# Patient Record
Sex: FEMALE | Race: WHITE | NOT HISPANIC OR LATINO | URBAN - METROPOLITAN AREA
[De-identification: names, ages, dates, MRNs, and addresses within clinical notes are randomized per-mention and may not be internally consistent; named-entity substitution may affect disease eponyms.]

---

## 2017-10-26 ENCOUNTER — OUTPATIENT (OUTPATIENT)
Dept: OUTPATIENT SERVICES | Facility: HOSPITAL | Age: 82
LOS: 1 days | End: 2017-10-26
Payer: MEDICARE

## 2017-10-26 VITALS
HEART RATE: 70 BPM | DIASTOLIC BLOOD PRESSURE: 90 MMHG | TEMPERATURE: 98 F | OXYGEN SATURATION: 98 % | HEIGHT: 65 IN | SYSTOLIC BLOOD PRESSURE: 140 MMHG | RESPIRATION RATE: 16 BRPM | WEIGHT: 171.52 LBS

## 2017-10-26 DIAGNOSIS — M16.11 UNILATERAL PRIMARY OSTEOARTHRITIS, RIGHT HIP: ICD-10-CM

## 2017-10-26 DIAGNOSIS — Z01.818 ENCOUNTER FOR OTHER PREPROCEDURAL EXAMINATION: ICD-10-CM

## 2017-10-26 DIAGNOSIS — Z96.642 PRESENCE OF LEFT ARTIFICIAL HIP JOINT: Chronic | ICD-10-CM

## 2017-10-26 LAB
ALBUMIN SERPL ELPH-MCNC: 4 G/DL — SIGNIFICANT CHANGE UP (ref 3.3–5)
ALP SERPL-CCNC: 55 U/L — SIGNIFICANT CHANGE UP (ref 30–120)
ALT FLD-CCNC: 27 U/L DA — SIGNIFICANT CHANGE UP (ref 10–60)
ANION GAP SERPL CALC-SCNC: 6 MMOL/L — SIGNIFICANT CHANGE UP (ref 5–17)
APTT BLD: 33.5 SEC — SIGNIFICANT CHANGE UP (ref 27.5–37.4)
AST SERPL-CCNC: 27 U/L — SIGNIFICANT CHANGE UP (ref 10–40)
BILIRUB SERPL-MCNC: 0.7 MG/DL — SIGNIFICANT CHANGE UP (ref 0.2–1.2)
BLD GP AB SCN SERPL QL: SIGNIFICANT CHANGE UP
BUN SERPL-MCNC: 17 MG/DL — SIGNIFICANT CHANGE UP (ref 7–23)
CALCIUM SERPL-MCNC: 10 MG/DL — SIGNIFICANT CHANGE UP (ref 8.4–10.5)
CHLORIDE SERPL-SCNC: 106 MMOL/L — SIGNIFICANT CHANGE UP (ref 96–108)
CO2 SERPL-SCNC: 29 MMOL/L — SIGNIFICANT CHANGE UP (ref 22–31)
CREAT SERPL-MCNC: 0.9 MG/DL — SIGNIFICANT CHANGE UP (ref 0.5–1.3)
GLUCOSE SERPL-MCNC: 112 MG/DL — HIGH (ref 70–99)
HCT VFR BLD CALC: 42 % — SIGNIFICANT CHANGE UP (ref 34.5–45)
HGB BLD-MCNC: 14.1 G/DL — SIGNIFICANT CHANGE UP (ref 11.5–15.5)
INR BLD: 2.37 RATIO — HIGH (ref 0.88–1.16)
MCHC RBC-ENTMCNC: 33.4 GM/DL — SIGNIFICANT CHANGE UP (ref 32–36)
MCHC RBC-ENTMCNC: 33.8 PG — SIGNIFICANT CHANGE UP (ref 27–34)
MCV RBC AUTO: 101 FL — HIGH (ref 80–100)
MRSA PCR RESULT.: SIGNIFICANT CHANGE UP
PLATELET # BLD AUTO: 206 K/UL — SIGNIFICANT CHANGE UP (ref 150–400)
POTASSIUM SERPL-MCNC: 4.7 MMOL/L — SIGNIFICANT CHANGE UP (ref 3.5–5.3)
POTASSIUM SERPL-SCNC: 4.7 MMOL/L — SIGNIFICANT CHANGE UP (ref 3.5–5.3)
PROT SERPL-MCNC: 7.7 G/DL — SIGNIFICANT CHANGE UP (ref 6–8.3)
PROTHROM AB SERPL-ACNC: 26.3 SEC — HIGH (ref 9.8–12.7)
RBC # BLD: 4.16 M/UL — SIGNIFICANT CHANGE UP (ref 3.8–5.2)
RBC # FLD: 12.1 % — SIGNIFICANT CHANGE UP (ref 10.3–14.5)
S AUREUS DNA NOSE QL NAA+PROBE: SIGNIFICANT CHANGE UP
SODIUM SERPL-SCNC: 141 MMOL/L — SIGNIFICANT CHANGE UP (ref 135–145)
WBC # BLD: 6 K/UL — SIGNIFICANT CHANGE UP (ref 3.8–10.5)
WBC # FLD AUTO: 6 K/UL — SIGNIFICANT CHANGE UP (ref 3.8–10.5)

## 2017-10-26 PROCEDURE — 85730 THROMBOPLASTIN TIME PARTIAL: CPT

## 2017-10-26 PROCEDURE — 87640 STAPH A DNA AMP PROBE: CPT

## 2017-10-26 PROCEDURE — 86850 RBC ANTIBODY SCREEN: CPT

## 2017-10-26 PROCEDURE — 93010 ELECTROCARDIOGRAM REPORT: CPT | Mod: NC

## 2017-10-26 PROCEDURE — 93005 ELECTROCARDIOGRAM TRACING: CPT

## 2017-10-26 PROCEDURE — 87641 MR-STAPH DNA AMP PROBE: CPT

## 2017-10-26 PROCEDURE — G0463: CPT

## 2017-10-26 PROCEDURE — 86901 BLOOD TYPING SEROLOGIC RH(D): CPT

## 2017-10-26 PROCEDURE — 85027 COMPLETE CBC AUTOMATED: CPT

## 2017-10-26 PROCEDURE — 86900 BLOOD TYPING SEROLOGIC ABO: CPT

## 2017-10-26 PROCEDURE — 85610 PROTHROMBIN TIME: CPT

## 2017-10-26 PROCEDURE — 80053 COMPREHEN METABOLIC PANEL: CPT

## 2017-10-26 NOTE — H&P PST ADULT - ASSESSMENT
81yo female patient scheduled for surgery on 11/14/17. She will obtain Medical and Cardiac clearances. She will obtain instructions from her Cardiologist re: holding Warfarin pre-op. She will take Levothyroxine and Pepcid on AM of surgery with a sip of water.   All other pre-op instructions reviewed with patient.   She denies any metal allergies.   She has had a stress test and echo and results are pending. 81yo female patient scheduled for surgery on 11/14/17. She will obtain Medical and Cardiac clearances. She will obtain instructions from her Cardiologist re: holding Warfarin pre-op. She will take Levothyroxine and Pepcid on AM of surgery with a sip of water.   All other pre-op instructions reviewed with patient.   She denies any metal allergies.   She has had a stress test and echo and results are pending.   When reviewed instructions pt states that she was told that she would be holding Warfarin for 5 days and will be bridging with Lovenox pre-operatively.

## 2017-10-26 NOTE — H&P PST ADULT - FAMILY HISTORY
Father  Still living? No  Family history of leukemia, Age at diagnosis: Age Unknown  Family history of myocardial infarction at age less than 60, Age at diagnosis: Age Unknown     Mother  Still living? No  Family history of DVT, Age at diagnosis: Age Unknown  Family history of dementia, Age at diagnosis: Age Unknown     Sibling  Still living? Yes, Estimated age: 71-80  Family history of asthma, Age at diagnosis: Age Unknown     Child  Still living? Yes, Estimated age: 51-60  Family history of factor V deficiency, Age at diagnosis: Age Unknown

## 2017-10-26 NOTE — H&P PST ADULT - PMH
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

## 2017-10-26 NOTE — H&P PST ADULT - HISTORY OF PRESENT ILLNESS
83yo female patient with approximately one year history of progressively worsening right hip pain. She states that she was told when she had her left THR in 2014, she was told that she would need this hip replaced as well. She rates the pain at approximately 5/10. She is not taking any pain medication, keeps her leg elevated as much as possible. She has not had any prior treatment for the pain. She was told that THR is recommended and she presents today for PSTs.

## 2017-10-26 NOTE — H&P PST ADULT - EXTREMITIES COMMENTS
Pt. states that she was evaluated by vascular specialist in 2014- pre-op. She was told to keep LE elevated. No change since then.

## 2017-10-26 NOTE — H&P PST ADULT - NSANTHOSAYNRD_GEN_A_CORE
No. GUNNAR screening performed.  STOP BANG Legend: 0-2 = LOW Risk; 3-4 = INTERMEDIATE Risk; 5-8 = HIGH Risk/has had a negative sleep study

## 2017-10-26 NOTE — H&P PST ADULT - PSH
H/O superior vena cava filter placement    S/P appendectomy  66 ya  S/P  section    S/P wrist surgery  1997, left  Status post total replacement of left hip

## 2017-10-26 NOTE — H&P PST ADULT - MUSCULOSKELETAL
detailed exam no joint swelling/no joint erythema/no joint warmth/right hip/decreased ROM due to pain/no calf tenderness/decreased ROM/diminished strength details…

## 2017-11-07 RX ORDER — APREPITANT 80 MG/1
40 CAPSULE ORAL ONCE
Qty: 0 | Refills: 0 | Status: COMPLETED | OUTPATIENT
Start: 2017-11-14 | End: 2017-11-14

## 2017-11-13 RX ORDER — FAMOTIDINE 10 MG/ML
0 INJECTION INTRAVENOUS
Qty: 0 | Refills: 0 | COMMUNITY
Start: 2017-11-13 | End: 2017-11-14

## 2017-11-14 ENCOUNTER — RESULT REVIEW (OUTPATIENT)
Age: 82
End: 2017-11-14

## 2017-11-14 ENCOUNTER — INPATIENT (INPATIENT)
Facility: HOSPITAL | Age: 82
LOS: 2 days | Discharge: NURSING, REHAB OR LONG-TERM CARE | DRG: 470 | End: 2017-11-17
Attending: SPECIALIST | Admitting: SPECIALIST
Payer: MEDICARE

## 2017-11-14 ENCOUNTER — TRANSCRIPTION ENCOUNTER (OUTPATIENT)
Age: 82
End: 2017-11-14

## 2017-11-14 VITALS
TEMPERATURE: 98 F | HEIGHT: 65 IN | OXYGEN SATURATION: 99 % | RESPIRATION RATE: 16 BRPM | SYSTOLIC BLOOD PRESSURE: 183 MMHG | HEART RATE: 66 BPM | DIASTOLIC BLOOD PRESSURE: 75 MMHG | WEIGHT: 168.21 LBS

## 2017-11-14 DIAGNOSIS — Z47.1 AFTERCARE FOLLOWING JOINT REPLACEMENT SURGERY: ICD-10-CM

## 2017-11-14 DIAGNOSIS — Z96.642 PRESENCE OF LEFT ARTIFICIAL HIP JOINT: Chronic | ICD-10-CM

## 2017-11-14 DIAGNOSIS — I82.409 ACUTE EMBOLISM AND THROMBOSIS OF UNSPECIFIED DEEP VEINS OF UNSPECIFIED LOWER EXTREMITY: ICD-10-CM

## 2017-11-14 DIAGNOSIS — Z95.828 PRESENCE OF OTHER VASCULAR IMPLANTS AND GRAFTS: ICD-10-CM

## 2017-11-14 DIAGNOSIS — E78.5 HYPERLIPIDEMIA, UNSPECIFIED: ICD-10-CM

## 2017-11-14 DIAGNOSIS — J45.909 UNSPECIFIED ASTHMA, UNCOMPLICATED: ICD-10-CM

## 2017-11-14 DIAGNOSIS — D68.51 ACTIVATED PROTEIN C RESISTANCE: ICD-10-CM

## 2017-11-14 DIAGNOSIS — E03.9 HYPOTHYROIDISM, UNSPECIFIED: ICD-10-CM

## 2017-11-14 DIAGNOSIS — M16.11 UNILATERAL PRIMARY OSTEOARTHRITIS, RIGHT HIP: ICD-10-CM

## 2017-11-14 DIAGNOSIS — F41.9 ANXIETY DISORDER, UNSPECIFIED: ICD-10-CM

## 2017-11-14 LAB
ANION GAP SERPL CALC-SCNC: 11 MMOL/L — SIGNIFICANT CHANGE UP (ref 5–17)
BUN SERPL-MCNC: 13 MG/DL — SIGNIFICANT CHANGE UP (ref 7–23)
CALCIUM SERPL-MCNC: 8.5 MG/DL — SIGNIFICANT CHANGE UP (ref 8.4–10.5)
CHLORIDE SERPL-SCNC: 102 MMOL/L — SIGNIFICANT CHANGE UP (ref 96–108)
CO2 SERPL-SCNC: 25 MMOL/L — SIGNIFICANT CHANGE UP (ref 22–31)
CREAT SERPL-MCNC: 0.83 MG/DL — SIGNIFICANT CHANGE UP (ref 0.5–1.3)
GLUCOSE SERPL-MCNC: 128 MG/DL — HIGH (ref 70–99)
HCT VFR BLD CALC: 36.7 % — SIGNIFICANT CHANGE UP (ref 34.5–45)
HGB BLD-MCNC: 12.6 G/DL — SIGNIFICANT CHANGE UP (ref 11.5–15.5)
INR BLD: 1.11 RATIO — SIGNIFICANT CHANGE UP (ref 0.88–1.16)
POTASSIUM SERPL-MCNC: 3.9 MMOL/L — SIGNIFICANT CHANGE UP (ref 3.5–5.3)
POTASSIUM SERPL-SCNC: 3.9 MMOL/L — SIGNIFICANT CHANGE UP (ref 3.5–5.3)
PROTHROM AB SERPL-ACNC: 12.1 SEC — SIGNIFICANT CHANGE UP (ref 9.8–12.7)
SODIUM SERPL-SCNC: 138 MMOL/L — SIGNIFICANT CHANGE UP (ref 135–145)

## 2017-11-14 PROCEDURE — 88305 TISSUE EXAM BY PATHOLOGIST: CPT | Mod: 26

## 2017-11-14 PROCEDURE — 73502 X-RAY EXAM HIP UNI 2-3 VIEWS: CPT | Mod: 26

## 2017-11-14 PROCEDURE — 99223 1ST HOSP IP/OBS HIGH 75: CPT

## 2017-11-14 PROCEDURE — 88311 DECALCIFY TISSUE: CPT | Mod: 26

## 2017-11-14 RX ORDER — VANCOMYCIN HCL 1 G
1000 VIAL (EA) INTRAVENOUS ONCE
Qty: 0 | Refills: 0 | Status: COMPLETED | OUTPATIENT
Start: 2017-11-14 | End: 2017-11-14

## 2017-11-14 RX ORDER — SENNA PLUS 8.6 MG/1
2 TABLET ORAL AT BEDTIME
Qty: 0 | Refills: 0 | Status: DISCONTINUED | OUTPATIENT
Start: 2017-11-14 | End: 2017-11-17

## 2017-11-14 RX ORDER — ALPRAZOLAM 0.25 MG
0.5 TABLET ORAL THREE TIMES A DAY
Qty: 0 | Refills: 0 | Status: DISCONTINUED | OUTPATIENT
Start: 2017-11-14 | End: 2017-11-17

## 2017-11-14 RX ORDER — HYDROMORPHONE HYDROCHLORIDE 2 MG/ML
0.5 INJECTION INTRAMUSCULAR; INTRAVENOUS; SUBCUTANEOUS
Qty: 0 | Refills: 0 | Status: DISCONTINUED | OUTPATIENT
Start: 2017-11-14 | End: 2017-11-17

## 2017-11-14 RX ORDER — OXYCODONE HYDROCHLORIDE 5 MG/1
10 TABLET ORAL
Qty: 0 | Refills: 0 | Status: DISCONTINUED | OUTPATIENT
Start: 2017-11-14 | End: 2017-11-17

## 2017-11-14 RX ORDER — ACETAMINOPHEN 500 MG
1000 TABLET ORAL EVERY 12 HOURS
Qty: 0 | Refills: 0 | Status: DISCONTINUED | OUTPATIENT
Start: 2017-11-15 | End: 2017-11-17

## 2017-11-14 RX ORDER — OXYCODONE HYDROCHLORIDE 5 MG/1
5 TABLET ORAL
Qty: 0 | Refills: 0 | Status: DISCONTINUED | OUTPATIENT
Start: 2017-11-14 | End: 2017-11-17

## 2017-11-14 RX ORDER — POLYETHYLENE GLYCOL 3350 17 G/17G
17 POWDER, FOR SOLUTION ORAL DAILY
Qty: 0 | Refills: 0 | Status: DISCONTINUED | OUTPATIENT
Start: 2017-11-14 | End: 2017-11-17

## 2017-11-14 RX ORDER — PANTOPRAZOLE SODIUM 20 MG/1
40 TABLET, DELAYED RELEASE ORAL
Qty: 0 | Refills: 0 | Status: DISCONTINUED | OUTPATIENT
Start: 2017-11-14 | End: 2017-11-17

## 2017-11-14 RX ORDER — ONDANSETRON 8 MG/1
4 TABLET, FILM COATED ORAL EVERY 6 HOURS
Qty: 0 | Refills: 0 | Status: DISCONTINUED | OUTPATIENT
Start: 2017-11-14 | End: 2017-11-17

## 2017-11-14 RX ORDER — SODIUM CHLORIDE 9 MG/ML
1000 INJECTION, SOLUTION INTRAVENOUS
Qty: 0 | Refills: 0 | Status: DISCONTINUED | OUTPATIENT
Start: 2017-11-14 | End: 2017-11-16

## 2017-11-14 RX ORDER — DOCUSATE SODIUM 100 MG
100 CAPSULE ORAL THREE TIMES A DAY
Qty: 0 | Refills: 0 | Status: DISCONTINUED | OUTPATIENT
Start: 2017-11-14 | End: 2017-11-17

## 2017-11-14 RX ORDER — SODIUM CHLORIDE 9 MG/ML
1000 INJECTION, SOLUTION INTRAVENOUS
Qty: 0 | Refills: 0 | Status: DISCONTINUED | OUTPATIENT
Start: 2017-11-14 | End: 2017-11-14

## 2017-11-14 RX ORDER — HYDROMORPHONE HYDROCHLORIDE 2 MG/ML
0.5 INJECTION INTRAMUSCULAR; INTRAVENOUS; SUBCUTANEOUS
Qty: 0 | Refills: 0 | Status: DISCONTINUED | OUTPATIENT
Start: 2017-11-14 | End: 2017-11-14

## 2017-11-14 RX ORDER — ENOXAPARIN SODIUM 100 MG/ML
30 INJECTION SUBCUTANEOUS EVERY 12 HOURS
Qty: 0 | Refills: 0 | Status: COMPLETED | OUTPATIENT
Start: 2017-11-15 | End: 2017-11-15

## 2017-11-14 RX ORDER — LEVOTHYROXINE SODIUM 125 MCG
75 TABLET ORAL DAILY
Qty: 0 | Refills: 0 | Status: DISCONTINUED | OUTPATIENT
Start: 2017-11-14 | End: 2017-11-17

## 2017-11-14 RX ORDER — WARFARIN SODIUM 2.5 MG/1
5 TABLET ORAL ONCE
Qty: 0 | Refills: 0 | Status: COMPLETED | OUTPATIENT
Start: 2017-11-15 | End: 2017-11-15

## 2017-11-14 RX ORDER — ACETAMINOPHEN 500 MG
1000 TABLET ORAL ONCE
Qty: 0 | Refills: 0 | Status: COMPLETED | OUTPATIENT
Start: 2017-11-14 | End: 2017-11-14

## 2017-11-14 RX ORDER — MAGNESIUM HYDROXIDE 400 MG/1
30 TABLET, CHEWABLE ORAL DAILY
Qty: 0 | Refills: 0 | Status: DISCONTINUED | OUTPATIENT
Start: 2017-11-14 | End: 2017-11-17

## 2017-11-14 RX ORDER — ATORVASTATIN CALCIUM 80 MG/1
40 TABLET, FILM COATED ORAL AT BEDTIME
Qty: 0 | Refills: 0 | Status: DISCONTINUED | OUTPATIENT
Start: 2017-11-14 | End: 2017-11-17

## 2017-11-14 RX ORDER — ENOXAPARIN SODIUM 100 MG/ML
80 INJECTION SUBCUTANEOUS EVERY 12 HOURS
Qty: 0 | Refills: 0 | Status: DISCONTINUED | OUTPATIENT
Start: 2017-11-16 | End: 2017-11-17

## 2017-11-14 RX ORDER — ACETAMINOPHEN 500 MG
1000 TABLET ORAL EVERY 6 HOURS
Qty: 0 | Refills: 0 | Status: COMPLETED | OUTPATIENT
Start: 2017-11-14 | End: 2017-11-15

## 2017-11-14 RX ADMIN — HYDROMORPHONE HYDROCHLORIDE 0.5 MILLIGRAM(S): 2 INJECTION INTRAMUSCULAR; INTRAVENOUS; SUBCUTANEOUS at 22:43

## 2017-11-14 RX ADMIN — HYDROMORPHONE HYDROCHLORIDE 0.5 MILLIGRAM(S): 2 INJECTION INTRAMUSCULAR; INTRAVENOUS; SUBCUTANEOUS at 14:37

## 2017-11-14 RX ADMIN — HYDROMORPHONE HYDROCHLORIDE 0.5 MILLIGRAM(S): 2 INJECTION INTRAMUSCULAR; INTRAVENOUS; SUBCUTANEOUS at 14:15

## 2017-11-14 RX ADMIN — Medication 100 MILLIGRAM(S): at 21:20

## 2017-11-14 RX ADMIN — HYDROMORPHONE HYDROCHLORIDE 0.5 MILLIGRAM(S): 2 INJECTION INTRAMUSCULAR; INTRAVENOUS; SUBCUTANEOUS at 16:08

## 2017-11-14 RX ADMIN — HYDROMORPHONE HYDROCHLORIDE 0.5 MILLIGRAM(S): 2 INJECTION INTRAMUSCULAR; INTRAVENOUS; SUBCUTANEOUS at 16:38

## 2017-11-14 RX ADMIN — Medication 250 MILLIGRAM(S): at 22:43

## 2017-11-14 RX ADMIN — Medication 1000 MILLIGRAM(S): at 18:13

## 2017-11-14 RX ADMIN — APREPITANT 40 MILLIGRAM(S): 80 CAPSULE ORAL at 09:56

## 2017-11-14 RX ADMIN — SODIUM CHLORIDE 100 MILLILITER(S): 9 INJECTION, SOLUTION INTRAVENOUS at 13:55

## 2017-11-14 RX ADMIN — HYDROMORPHONE HYDROCHLORIDE 0.5 MILLIGRAM(S): 2 INJECTION INTRAMUSCULAR; INTRAVENOUS; SUBCUTANEOUS at 23:00

## 2017-11-14 RX ADMIN — Medication 400 MILLIGRAM(S): at 18:13

## 2017-11-14 RX ADMIN — ATORVASTATIN CALCIUM 40 MILLIGRAM(S): 80 TABLET, FILM COATED ORAL at 21:20

## 2017-11-14 NOTE — PHYSICAL THERAPY INITIAL EVALUATION ADULT - MANUAL MUSCLE TESTING RESULTS, REHAB EVAL
grossly assessed due to/at least 3/5 assessed due to bed mobility and transfer from supine to short sit.

## 2017-11-14 NOTE — PHYSICAL THERAPY INITIAL EVALUATION ADULT - GAIT TRAINING, PT EVAL
Pt to walk 150 feet independently with rolling walker in 3 - 5 session Pt to walk 40 feet with contact guard and rolling walker in 3 - 5 session

## 2017-11-14 NOTE — CONSULT NOTE ADULT - SUBJECTIVE AND OBJECTIVE BOX
Patient is a 82y old  Female who presents with a chief complaint of right total hip replacement (2017 16:10)  81yo female patient with approximately one year history of progressively worsening right hip pain. She states that she was told when she had her left THR in , she was told that she would need this hip replaced as well. She rates the pain at approximately 5/10. She is not taking any pain medication, keeps her leg elevated as much as possible. She has not had any prior treatment for the pain.     HPI:   Pt is seen and examined.      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 1000 milliGRAM(s) IV Intermittent every 6 hours  lactated ringers. 1000 milliLiter(s) (100 mL/Hr) IV Continuous <Continuous>    MEDICATIONS  (PRN):  HYDROmorphone  Injectable 0.5 milliGRAM(s) IV Push every 10 minutes PRN Moderate Pain (4 - 6)  promethazine IVPB 6.25 milliGRAM(s) IV Intermittent once PRN Nausea and/or Vomiting      Allergies    malt (Hives; Rash)  penicillin (Hives; Rash)  shellfish (Hives; Rash)    Intolerances    SOCIAL HISTORY:  Smoker:  YES / NO        PACK YEARS:                         WHEN QUIT?  ETOH use:  YES / NO               FREQUENCY / QUANTITY:  Ilicit Drug use:  YES / NO  Occupation:  Assisted device use (Cane / Walker):  Live with:    FAMILY HISTORY:  Family history of factor V deficiency (Child)  Family history of asthma (Sibling)  Family history of dementia (Mother)  Family history of DVT (Mother)  Family history of myocardial infarction at age less than 60 (Father)  Family history of leukemia (Father)      Vital Signs Last 24 Hrs  T(C): 36.4 (2017 09:00), Max: 36.4 (2017 09:00)  T(F): 97.6 (2017 09:00), Max: 97.6 (2017 09:00)  HR: 63 (2017 14:10) (59 - 85)  BP: 136/73 (2017 14:10) (120/66 - 183/75)  BP(mean): --  RR: 15 (2017 14:10) (15 - 20)  SpO2: 100% (2017 14:10) (99% - 100%)            LABS:          PT/INR - ( 2017 09:24 )   PT: 12.1 sec;   INR: 1.11 ratio Patient is a 82y old  Female who presents with a chief complaint of right total hip replacement (2017 16:10)  83yo female patient with approximately one year history of progressively worsening right hip pain. She states that she was told when she had her left THR in , she was told that she would need this hip replaced as well. She rates the pain at approximately 5/10. She is not taking any pain medication, keeps her leg elevated as much as possible. She has not had any prior treatment for the pain.     HPI:   Pt is seen and examined.  s/p right THR. pain is controlled.    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 1000 milliGRAM(s) IV Intermittent every 6 hours  lactated ringers. 1000 milliLiter(s) (100 mL/Hr) IV Continuous <Continuous>    MEDICATIONS  (PRN):  HYDROmorphone  Injectable 0.5 milliGRAM(s) IV Push every 10 minutes PRN Moderate Pain (4 - 6)  promethazine IVPB 6.25 milliGRAM(s) IV Intermittent once PRN Nausea and/or Vomiting      Allergies    malt (Hives; Rash)  penicillin (Hives; Rash)  shellfish (Hives; Rash)    Intolerances    SOCIAL HISTORY:  Smoker:   NO        PACK YEARS:                         WHEN QUIT?  ETOH use:  YES                FREQUENCY / QUANTITY:  Ilicit Drug use:  NO      FAMILY HISTORY:  Family history of factor V deficiency (Child)  Family history of asthma (Sibling)  Family history of dementia (Mother)  Family history of DVT (Mother)  Family history of myocardial infarction at age less than 60 (Father)  Family history of leukemia (Father)      Vital Signs Last 24 Hrs  T(C): 36.4 (2017 09:00), Max: 36.4 (2017 09:00)  T(F): 97.6 (2017 09:00), Max: 97.6 (2017 09:00)  HR: 63 (2017 14:10) (59 - 85)  BP: 136/73 (2017 14:10) (120/66 - 183/75)  BP(mean): --  RR: 15 (2017 14:10) (15 - 20)  SpO2: 100% (2017 14:10) (99% - 100%)            LABS:          PT/INR - ( 2017 09:24 )   PT: 12.1 sec;   INR: 1.11 ratio

## 2017-11-14 NOTE — PHYSICAL THERAPY INITIAL EVALUATION ADULT - TRANSFER TRAINING, PT EVAL
Pt to transfer from sit to stand independently with rolling walker in 3 - 5 sessions Pt to transfer from sit to stand with contact guard and rolling walker in 3 - 5 sessions

## 2017-11-14 NOTE — CONSULT NOTE ADULT - PROBLEM SELECTOR RECOMMENDATION 9
pain meds as per anesthesia.  PT/OT.  DVT prophylaxis.  DVT ppx: [ ]ASA81 [ ] [id number] [ ] Lovenox [ ] Coumadin   [ ] Eliquis [  ] Heparin  Dispo:     Home [ ]     Acute Rehab [ ]     REINALDO [ ]     TBD [x ] pain meds - oxycodone.  PT/OT.  DVT prophylaxis.  DVT ppx: [ ]ASA81 [ ] JMT583 [ x] Lovenox [ x] Coumadin   [ ] Eliquis [  ] Heparin  Dispo:     Home [ ]     Acute Rehab [ ]     RE[facility]AL[facility] [ x]     TBD [x ]

## 2017-11-14 NOTE — BRIEF OPERATIVE NOTE - PROCEDURE
<<-----Click on this checkbox to enter Procedure Arthroplasty of right hip  11/14/2017    Active  Fredrick Manzanares

## 2017-11-15 ENCOUNTER — TRANSCRIPTION ENCOUNTER (OUTPATIENT)
Age: 82
End: 2017-11-15

## 2017-11-15 DIAGNOSIS — Z95.828 PRESENCE OF OTHER VASCULAR IMPLANTS AND GRAFTS: ICD-10-CM

## 2017-11-15 LAB
ANION GAP SERPL CALC-SCNC: 8 MMOL/L — SIGNIFICANT CHANGE UP (ref 5–17)
BUN SERPL-MCNC: 9 MG/DL — SIGNIFICANT CHANGE UP (ref 7–23)
CALCIUM SERPL-MCNC: 8.7 MG/DL — SIGNIFICANT CHANGE UP (ref 8.4–10.5)
CHLORIDE SERPL-SCNC: 101 MMOL/L — SIGNIFICANT CHANGE UP (ref 96–108)
CO2 SERPL-SCNC: 27 MMOL/L — SIGNIFICANT CHANGE UP (ref 22–31)
CREAT SERPL-MCNC: 0.75 MG/DL — SIGNIFICANT CHANGE UP (ref 0.5–1.3)
GLUCOSE SERPL-MCNC: 125 MG/DL — HIGH (ref 70–99)
HCT VFR BLD CALC: 35.7 % — SIGNIFICANT CHANGE UP (ref 34.5–45)
HGB BLD-MCNC: 11.7 G/DL — SIGNIFICANT CHANGE UP (ref 11.5–15.5)
INR BLD: 1.2 RATIO — HIGH (ref 0.88–1.16)
MCHC RBC-ENTMCNC: 32.8 GM/DL — SIGNIFICANT CHANGE UP (ref 32–36)
MCHC RBC-ENTMCNC: 33.4 PG — SIGNIFICANT CHANGE UP (ref 27–34)
MCV RBC AUTO: 101.6 FL — HIGH (ref 80–100)
PLATELET # BLD AUTO: 154 K/UL — SIGNIFICANT CHANGE UP (ref 150–400)
POTASSIUM SERPL-MCNC: 3.7 MMOL/L — SIGNIFICANT CHANGE UP (ref 3.5–5.3)
POTASSIUM SERPL-SCNC: 3.7 MMOL/L — SIGNIFICANT CHANGE UP (ref 3.5–5.3)
PROTHROM AB SERPL-ACNC: 13.1 SEC — HIGH (ref 9.8–12.7)
RBC # BLD: 3.51 M/UL — LOW (ref 3.8–5.2)
RBC # FLD: 12.4 % — SIGNIFICANT CHANGE UP (ref 10.3–14.5)
SODIUM SERPL-SCNC: 136 MMOL/L — SIGNIFICANT CHANGE UP (ref 135–145)
WBC # BLD: 7.8 K/UL — SIGNIFICANT CHANGE UP (ref 3.8–10.5)
WBC # FLD AUTO: 7.8 K/UL — SIGNIFICANT CHANGE UP (ref 3.8–10.5)

## 2017-11-15 PROCEDURE — 99233 SBSQ HOSP IP/OBS HIGH 50: CPT

## 2017-11-15 RX ORDER — AMLODIPINE BESYLATE 2.5 MG/1
5 TABLET ORAL DAILY
Qty: 0 | Refills: 0 | Status: DISCONTINUED | OUTPATIENT
Start: 2017-11-15 | End: 2017-11-17

## 2017-11-15 RX ORDER — CELECOXIB 200 MG/1
200 CAPSULE ORAL
Qty: 0 | Refills: 0 | Status: DISCONTINUED | OUTPATIENT
Start: 2017-11-16 | End: 2017-11-17

## 2017-11-15 RX ADMIN — ENOXAPARIN SODIUM 30 MILLIGRAM(S): 100 INJECTION SUBCUTANEOUS at 21:13

## 2017-11-15 RX ADMIN — Medication 1000 MILLIGRAM(S): at 12:52

## 2017-11-15 RX ADMIN — PANTOPRAZOLE SODIUM 40 MILLIGRAM(S): 20 TABLET, DELAYED RELEASE ORAL at 06:19

## 2017-11-15 RX ADMIN — Medication 1000 MILLIGRAM(S): at 23:58

## 2017-11-15 RX ADMIN — Medication 1000 MILLIGRAM(S): at 07:10

## 2017-11-15 RX ADMIN — OXYCODONE HYDROCHLORIDE 5 MILLIGRAM(S): 5 TABLET ORAL at 13:51

## 2017-11-15 RX ADMIN — HYDROMORPHONE HYDROCHLORIDE 0.5 MILLIGRAM(S): 2 INJECTION INTRAMUSCULAR; INTRAVENOUS; SUBCUTANEOUS at 04:00

## 2017-11-15 RX ADMIN — WARFARIN SODIUM 5 MILLIGRAM(S): 2.5 TABLET ORAL at 21:13

## 2017-11-15 RX ADMIN — HYDROMORPHONE HYDROCHLORIDE 0.5 MILLIGRAM(S): 2 INJECTION INTRAMUSCULAR; INTRAVENOUS; SUBCUTANEOUS at 03:36

## 2017-11-15 RX ADMIN — AMLODIPINE BESYLATE 5 MILLIGRAM(S): 2.5 TABLET ORAL at 16:08

## 2017-11-15 RX ADMIN — Medication 1000 MILLIGRAM(S): at 01:15

## 2017-11-15 RX ADMIN — ENOXAPARIN SODIUM 30 MILLIGRAM(S): 100 INJECTION SUBCUTANEOUS at 08:57

## 2017-11-15 RX ADMIN — Medication 100 MILLIGRAM(S): at 13:50

## 2017-11-15 RX ADMIN — OXYCODONE HYDROCHLORIDE 5 MILLIGRAM(S): 5 TABLET ORAL at 07:58

## 2017-11-15 RX ADMIN — Medication 1000 MILLIGRAM(S): at 13:45

## 2017-11-15 RX ADMIN — OXYCODONE HYDROCHLORIDE 5 MILLIGRAM(S): 5 TABLET ORAL at 08:30

## 2017-11-15 RX ADMIN — OXYCODONE HYDROCHLORIDE 5 MILLIGRAM(S): 5 TABLET ORAL at 14:32

## 2017-11-15 RX ADMIN — Medication 400 MILLIGRAM(S): at 06:19

## 2017-11-15 RX ADMIN — Medication 75 MICROGRAM(S): at 06:19

## 2017-11-15 RX ADMIN — Medication 400 MILLIGRAM(S): at 00:41

## 2017-11-15 RX ADMIN — ATORVASTATIN CALCIUM 40 MILLIGRAM(S): 80 TABLET, FILM COATED ORAL at 21:13

## 2017-11-15 RX ADMIN — Medication 100 MILLIGRAM(S): at 06:19

## 2017-11-15 RX ADMIN — Medication 100 MILLIGRAM(S): at 21:13

## 2017-11-15 RX ADMIN — Medication 0.5 MILLIGRAM(S): at 09:39

## 2017-11-15 NOTE — DISCHARGE NOTE ADULT - CARE PROVIDER_API CALL
Juan José Mcclure), Orthopaedic Surgery  [address] [place] [address]  Phone: [phone]  Fax: [phone]

## 2017-11-15 NOTE — DISCHARGE NOTE ADULT - ADDITIONAL INSTRUCTIONS
Follow up with Dr. Mcclure 2 weeks after your surgery. Please call his office to make an appointment

## 2017-11-15 NOTE — DISCHARGE NOTE ADULT - PLAN OF CARE
Your new total hip replacement requires proper care.  Your surgical care provider is your best resource for information.  Your Physical Therapy /Occupational Therapy will include Ambulation, Transfers , Stairs, ADLs (activities of daily living), range of motion, and isometrics.  Your participation is vital for the fullest recovery and best results.  You may bear full weight as tolerated with rolling walker, cane or assistive device.    TOTAL HIP PRECAUTIONS   Do not bend your hip more than 90 degrees.   Do not rotate your leg drastically inward.   Continue abduction pillow while in bed.   Sit in Hip Chair or a chair that does not allow your to bend more than 90 degrees.  Do not sit on low chairs or low toilet seats.  Do not take a tub bath yet.   Do not resume driving until you have your surgeon’s permission.     Keep incision clean and dry.  Change the dressing daily if there is drainage noted.  When there is no drainage the wound may be open to air.   The wound is closed with either sutures (stiches) or Prineo (glued on mesh tape.)  Both sutures and Prineo are removed 2 weeks after surgery at rehab facility or in surgeon's office.  If there is no wet drainage you may shower and pat dry with a clean towel. Improve quality of life

## 2017-11-15 NOTE — OCCUPATIONAL THERAPY INITIAL EVALUATION ADULT - ORIENTATION, REHAB EVAL
Pt educated regarding fall prevention in hospital and recommendation to use call bell/ask for assistance with all THP's ADL's/transfers etc./oriented to person, place, time and situation

## 2017-11-15 NOTE — DIETITIAN INITIAL EVALUATION ADULT. - OTHER INFO
Pt admitted for THR pmhx: hypothyroid, anxiety, factor V leiden, josé luis filter. Pt tolerating regular diet at this time s trouble. No GI symptoms reported.  Briefly reviewed Vitamin K/coumadin interaction. Pt states she has been on coumadin for 12 yrs and is aware of interaction. She states she generally follows a healthy, balanced diet.

## 2017-11-15 NOTE — DISCHARGE NOTE ADULT - PATIENT PORTAL LINK FT
“You can access the FollowHealth Patient Portal, offered by [facility], by registering with the following website: [email or web address]”

## 2017-11-15 NOTE — OCCUPATIONAL THERAPY INITIAL EVALUATION ADULT - ADDITIONAL COMMENTS
Pt lives alone in a 3 story house on the 3rd floor +[address] with railing. + tub shower. Pt owns a SAC commode and shower chair

## 2017-11-15 NOTE — DISCHARGE NOTE ADULT - MEDICATION SUMMARY - MEDICATIONS TO TAKE
I will START or STAY ON the medications listed below when I get home from the hospital:    acetaminophen 500 mg oral tablet  -- 2 tab(s) by mouth every 12 hours  -- Indication: For Pain    oxyCODONE 5 mg oral tablet  -- 1 tab(s) by mouth every 3 hours, As needed, Mild Pain  -- Indication: For Pain    celecoxib 200 mg oral capsule  -- 1 cap(s) by mouth 2 times a day (before meals) for 21 days total post-op  -- Indication: For Prevention no excess bone growth at surgical site    oxyCODONE 10 mg oral tablet  -- 1 tab(s) by mouth every 3 hours, As needed, Moderate Pain  -- Indication: For Pain    warfarin 5 mg oral tablet  -- 1 tab(s) by mouth once; Coumadin to be given each night dosed appropriately to achieve therapeutic INR  -- Indication: For Prevention of blood clots, heart attack, stroke    enoxaparin  -- 80 milligram(s) subcutaneously every 12 hours, until INR therapeutic on Coumadin  -- Indication: For Prevention of blood clots, heart attack, stroke    Crestor 10 mg oral tablet  -- 1 tab(s) by mouth once a day (at bedtime)  -- Indication: For High cholesterol    alprazolam 0.5 mg oral tablet  -- 1 tab(s) by mouth 3 times a day, As Needed  -- Indication: For Anxiety    senna oral tablet  -- 2 tab(s) by mouth once a day (at bedtime), As needed, Constipation  -- Indication: For constipation    docusate sodium 100 mg oral capsule  -- 1 cap(s) by mouth 3 times a day  -- Indication: For Stool softener    polyethylene glycol 3350 oral powder for reconstitution  -- 17 gram(s) by mouth once a day, As needed, Constipation  -- Indication: For constipation    pantoprazole 40 mg oral delayed release tablet  -- 1 tab(s) by mouth once a day (before a meal)  -- Indication: For Prevention of blood clots    Synthroid 75 mcg (0.075 mg) oral tablet  -- 1 tab(s) by mouth once a day  -- Indication: For thyroid disease

## 2017-11-15 NOTE — DISCHARGE NOTE ADULT - MEDICATION SUMMARY - MEDICATIONS TO STOP TAKING
I will STOP taking the medications listed below when I get home from the hospital:    Therapeutic Multiple Vitamins oral tablet  -- 1 tab(s) by mouth once a day    coral calcium  -- 1 tab(s) by mouth once a day    Pepcid 20 mg oral tablet  -- orally twice, pre operatively

## 2017-11-15 NOTE — DISCHARGE NOTE ADULT - CARE PLAN
Principal Discharge DX:	Status post total hip replacement, right  Goal:	Improve quality of life  Instructions for follow-up, activity and diet:	Your new total hip replacement requires proper care.  Your surgical care provider is your best resource for information.  Your Physical Therapy /Occupational Therapy will include Ambulation, Transfers , Stairs, ADLs (activities of daily living), range of motion, and isometrics.  Your participation is vital for the fullest recovery and best results.  You may bear full weight as tolerated with rolling walker, cane or assistive device.    TOTAL HIP PRECAUTIONS   Do not bend your hip more than 90 degrees.   Do not rotate your leg drastically inward.   Continue abduction pillow while in bed.   Sit in Hip Chair or a chair that does not allow your to bend more than 90 degrees.  Do not sit on low chairs or low toilet seats.  Do not take a tub bath yet.   Do not resume driving until you have your surgeon’s permission.     Keep incision clean and dry.  Change the dressing daily if there is drainage noted.  When there is no drainage the wound may be open to air.   The wound is closed with either sutures (stiches) or Prineo (glued on mesh tape.)  Both sutures and Prineo are removed 2 weeks after surgery at rehab facility or in surgeon's office.  If there is no wet drainage you may shower and pat dry with a clean towel.

## 2017-11-15 NOTE — DISCHARGE NOTE ADULT - HOSPITAL COURSE
RUBINA GAUTHIER    Admitted on 11-14-17     82y y.o.  Female with history of Primary osteoarthritis of right hip    Surgery:   Arthroplasty of right hip    Orthopedic Surgeon:   Dr. LOGAN Mcclure    Opal-operative antibiotic:  vancomycin  IVPB:      Consulted Services : Hospitalist, Physical Therapy, Occupational Therapy    Typical Physical & occupational therapy modalities post Arthroplasty of right hip   were performed including ambulation training, range of motion, ADL's, and transfers.     DVT prophylaxis:    enoxaparin Injectable: 30 milliGRAM(s)  enoxaparin Injectable: 80 milliGRAM(s)  warfarin: 5 milliGRAM(s)       The patient had a clean appearing surgical incision with no sign of surgical site infections and had a stable neuro / vascular exam of the operated extremity.  After progression of mobility guided by the PT/ OT staff,  the patient was felt to benefit from further rehabilitative care for restoration to level of function. This was felt to best be accomplished in Rehab.    Discharge and Orthopedic Care instructions were delineated in the Discharge Plan and reviewed with the patient. All medications were delineated in the medication reconciliation tool and key points were reviewed with the patient.     This patient was deemed stable from an Orthopedic & medical standpoint for discharge today.  Upon discharge from the rehab facility, she will  follow up with Dr. LOGAN Mcclure for further Orthopedic care.

## 2017-11-16 DIAGNOSIS — R55 SYNCOPE AND COLLAPSE: ICD-10-CM

## 2017-11-16 DIAGNOSIS — M16.11 UNILATERAL PRIMARY OSTEOARTHRITIS, RIGHT HIP: ICD-10-CM

## 2017-11-16 LAB
ANION GAP SERPL CALC-SCNC: 8 MMOL/L — SIGNIFICANT CHANGE UP (ref 5–17)
BUN SERPL-MCNC: 10 MG/DL — SIGNIFICANT CHANGE UP (ref 7–23)
CALCIUM SERPL-MCNC: 8.9 MG/DL — SIGNIFICANT CHANGE UP (ref 8.4–10.5)
CHLORIDE SERPL-SCNC: 102 MMOL/L — SIGNIFICANT CHANGE UP (ref 96–108)
CO2 SERPL-SCNC: 27 MMOL/L — SIGNIFICANT CHANGE UP (ref 22–31)
CREAT SERPL-MCNC: 0.81 MG/DL — SIGNIFICANT CHANGE UP (ref 0.5–1.3)
GLUCOSE BLDC GLUCOMTR-MCNC: 179 MG/DL — HIGH (ref 70–99)
GLUCOSE SERPL-MCNC: 116 MG/DL — HIGH (ref 70–99)
HCT VFR BLD CALC: 34.9 % — SIGNIFICANT CHANGE UP (ref 34.5–45)
HGB BLD-MCNC: 12 G/DL — SIGNIFICANT CHANGE UP (ref 11.5–15.5)
INR BLD: 1.34 RATIO — HIGH (ref 0.88–1.16)
MCHC RBC-ENTMCNC: 34.5 GM/DL — SIGNIFICANT CHANGE UP (ref 32–36)
MCHC RBC-ENTMCNC: 34.9 PG — HIGH (ref 27–34)
MCV RBC AUTO: 101.3 FL — HIGH (ref 80–100)
PLATELET # BLD AUTO: 145 K/UL — LOW (ref 150–400)
POTASSIUM SERPL-MCNC: 3.3 MMOL/L — LOW (ref 3.5–5.3)
POTASSIUM SERPL-SCNC: 3.3 MMOL/L — LOW (ref 3.5–5.3)
PROTHROM AB SERPL-ACNC: 14.7 SEC — HIGH (ref 9.8–12.7)
RBC # BLD: 3.44 M/UL — LOW (ref 3.8–5.2)
RBC # FLD: 12.2 % — SIGNIFICANT CHANGE UP (ref 10.3–14.5)
SODIUM SERPL-SCNC: 137 MMOL/L — SIGNIFICANT CHANGE UP (ref 135–145)
WBC # BLD: 10.2 K/UL — SIGNIFICANT CHANGE UP (ref 3.8–10.5)
WBC # FLD AUTO: 10.2 K/UL — SIGNIFICANT CHANGE UP (ref 3.8–10.5)

## 2017-11-16 PROCEDURE — 70450 CT HEAD/BRAIN W/O DYE: CPT | Mod: 26

## 2017-11-16 PROCEDURE — 93010 ELECTROCARDIOGRAM REPORT: CPT

## 2017-11-16 PROCEDURE — 99291 CRITICAL CARE FIRST HOUR: CPT

## 2017-11-16 RX ORDER — POTASSIUM CHLORIDE 20 MEQ
40 PACKET (EA) ORAL ONCE
Qty: 0 | Refills: 0 | Status: COMPLETED | OUTPATIENT
Start: 2017-11-16 | End: 2017-11-16

## 2017-11-16 RX ORDER — ALPRAZOLAM 0.25 MG
0.25 TABLET ORAL ONCE
Qty: 0 | Refills: 0 | Status: DISCONTINUED | OUTPATIENT
Start: 2017-11-16 | End: 2017-11-16

## 2017-11-16 RX ORDER — SODIUM CHLORIDE 9 MG/ML
1000 INJECTION INTRAMUSCULAR; INTRAVENOUS; SUBCUTANEOUS
Qty: 0 | Refills: 0 | Status: DISCONTINUED | OUTPATIENT
Start: 2017-11-16 | End: 2017-11-17

## 2017-11-16 RX ADMIN — CELECOXIB 200 MILLIGRAM(S): 200 CAPSULE ORAL at 19:08

## 2017-11-16 RX ADMIN — Medication 1000 MILLIGRAM(S): at 00:40

## 2017-11-16 RX ADMIN — Medication 1000 MILLIGRAM(S): at 12:22

## 2017-11-16 RX ADMIN — Medication 40 MILLIEQUIVALENT(S): at 12:20

## 2017-11-16 RX ADMIN — Medication 0.25 MILLIGRAM(S): at 10:34

## 2017-11-16 RX ADMIN — OXYCODONE HYDROCHLORIDE 5 MILLIGRAM(S): 5 TABLET ORAL at 13:23

## 2017-11-16 RX ADMIN — ATORVASTATIN CALCIUM 40 MILLIGRAM(S): 80 TABLET, FILM COATED ORAL at 21:05

## 2017-11-16 RX ADMIN — Medication 100 MILLIGRAM(S): at 05:36

## 2017-11-16 RX ADMIN — ENOXAPARIN SODIUM 80 MILLIGRAM(S): 100 INJECTION SUBCUTANEOUS at 10:34

## 2017-11-16 RX ADMIN — OXYCODONE HYDROCHLORIDE 5 MILLIGRAM(S): 5 TABLET ORAL at 14:05

## 2017-11-16 RX ADMIN — ENOXAPARIN SODIUM 80 MILLIGRAM(S): 100 INJECTION SUBCUTANEOUS at 21:05

## 2017-11-16 RX ADMIN — CELECOXIB 200 MILLIGRAM(S): 200 CAPSULE ORAL at 18:08

## 2017-11-16 RX ADMIN — SODIUM CHLORIDE 100 MILLILITER(S): 9 INJECTION INTRAMUSCULAR; INTRAVENOUS; SUBCUTANEOUS at 10:29

## 2017-11-16 RX ADMIN — Medication 75 MICROGRAM(S): at 05:36

## 2017-11-16 RX ADMIN — Medication 1000 MILLIGRAM(S): at 12:20

## 2017-11-16 RX ADMIN — Medication 100 MILLIGRAM(S): at 21:05

## 2017-11-16 RX ADMIN — AMLODIPINE BESYLATE 5 MILLIGRAM(S): 2.5 TABLET ORAL at 05:37

## 2017-11-16 RX ADMIN — Medication 100 MILLIGRAM(S): at 13:23

## 2017-11-16 RX ADMIN — PANTOPRAZOLE SODIUM 40 MILLIGRAM(S): 20 TABLET, DELAYED RELEASE ORAL at 05:37

## 2017-11-16 NOTE — CONSULT NOTE ADULT - ASSESSMENT
Pt s/p THR with nearsyncope. Stable vital signs. Hx DVT with IVC filter, Factor V Leiden, but PE unlikely. Probably due to dehydration, meds, vasovagal.
83 y/o female,  s/p right THR.

## 2017-11-16 NOTE — CONSULT NOTE ADULT - PROBLEM SELECTOR PROBLEM 3
Asthma, unspecified asthma severity, unspecified whether complicated, unspecified whether persistent
Acquired hypothyroidism

## 2017-11-16 NOTE — CONSULT NOTE ADULT - PROBLEM SELECTOR PROBLEM 4
Anxiety
Asthma, unspecified asthma severity, unspecified whether complicated, unspecified whether persistent

## 2017-11-16 NOTE — CONSULT NOTE ADULT - SUBJECTIVE AND OBJECTIVE BOX
PULMONARY/CRITICAL CARE        Patient is a 82y old  Female who presents with a chief complaint of right total hip replacement (15 Nov 2017 09:51)  Pt had nearsyncopal episode while sitting up today. No cp, sob. Was somewhat diaphoretic. Had been given Norvasc for hi bp. Alert and conversant now.  Hx DVT with IVC filter 10 yrs ago.  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  Migraine headache  Primary osteoarthritis of right hip  DVT (deep venous thrombosis): approx  - left leg  Asthma  Osteoarthritis: left hip  Factor V Leiden  Anxiety  Hypothyroid  Dyslipidemia  History of superior vena cava filter placement  Status post total replacement of left hip:   H/O superior vena cava filter placement:   S/P  section:   S/P wrist surgery: , left  S/P appendectomy: 66 ya    Allergies    malt (Hives; Rash)  penicillin (Hives; Rash)  shellfish (Hives; Rash)    Intolerances  No cigs, etoh    FAMILY HISTORY:  Family history of factor V deficiency (Child)  Family history of asthma (Sibling)  Family history of dementia (Mother)  Family history of DVT (Mother)  Family history of myocardial infarction at age less than 60 (Father)  Family history of leukemia (Father)      Review of Systems:  CONSTITUTIONAL: No fever, chills, or fatigue  EYES: No eye pain, visual disturbances, or discharge  ENMT:  No difficulty hearing, tinnitus, vertigo; No sinus or throat pain  NECK: No pain or stiffness  RESPIRATORY: No cough, wheezing, chills or hemoptysis; No shortness of breath  CARDIOVASCULAR: No chest pain, palpitations, dizziness, or leg swelling  GASTROINTESTINAL: No abdominal or epigastric pain. No nausea, vomiting, or hematemesis; No diarrhea or constipation. No melena or hematochezia.  GENITOURINARY: No dysuria, frequency, hematuria, or incontinence  NEUROLOGICAL: No headaches, memory lnumbness, or tremors  SKIN: No itching, burning, rashes, or lesions   MUSCULOSKELETAL: No joint pain or swelling; No muscle, back, or extremity pain  PSYCHIATRIC: No depression, anxiety, mood swings, or difficulty sleeping      Medications:    amLODIPine   Tablet 5 milliGRAM(s) Oral daily      acetaminophen   Tablet. 1000 milliGRAM(s) Oral every 12 hours  ALPRAZolam 0.5 milliGRAM(s) Oral three times a day PRN  HYDROmorphone  Injectable 0.5 milliGRAM(s) IV Push every 3 hours PRN  ondansetron Injectable 4 milliGRAM(s) IV Push every 6 hours PRN  oxyCODONE    IR 5 milliGRAM(s) Oral every 3 hours PRN  oxyCODONE    IR 10 milliGRAM(s) Oral every 3 hours PRN      enoxaparin Injectable 80 milliGRAM(s) SubCutaneous every 12 hours    aluminum hydroxide/magnesium hydroxide/simethicone Suspension 30 milliLiter(s) Oral four times a day PRN  bisacodyl Suppository 10 milliGRAM(s) Rectal daily PRN  docusate sodium 100 milliGRAM(s) Oral three times a day  magnesium hydroxide Suspension 30 milliLiter(s) Oral daily PRN  pantoprazole    Tablet 40 milliGRAM(s) Oral before breakfast  polyethylene glycol 3350 17 Gram(s) Oral daily PRN  senna 2 Tablet(s) Oral at bedtime PRN      atorvastatin 40 milliGRAM(s) Oral at bedtime  levothyroxine 75 MICROGram(s) Oral daily    lactated ringers. 1000 milliLiter(s) IV Continuous <Continuous>                ICU Vital Signs Last 24 Hrs  T(C): 36.7 (2017 07:25), Max: 37.2 (15 Nov 2017 15:05)  T(F): 98 (2017 07:25), Max: 99 (15 Nov 2017 23:00)  HR: 87 (2017 07:25) (67 - 98)  BP: 128/72 (2017 07:25) (128/72 - 188/77)  BP(mean): --  ABP: --  ABP(mean): --  RR: 18 (2017 07:25) (16 - 18)  SpO2: 92% (2017 07:25) (92% - 98%)    Vital Signs Last 24 Hrs  T(C): 36.7 (2017 07:25), Max: 37.2 (15 Nov 2017 15:05)  T(F): 98 (2017 07:25), Max: 99 (15 Nov 2017 23:00)  HR: 87 (2017 07:25) (67 - 98)  BP: 128/72 (2017 07:25) (128/72 - 188/77)  BP(mean): --  RR: 18 (2017 07:25) (16 - 18)  SpO2: 92% (2017 07:25) (92% - 98%)        I&O's Detail        LABS:                        12.0   10.2  )-----------( 145      ( 2017 07:13 )             34.9     11-16    137  |  102  |  10  ----------------------------<  116<H>  3.3<L>   |  27  |  0.81    Ca    8.9      2017 07:13            CAPILLARY BLOOD GLUCOSE      POCT Blood Glucose.: 179 mg/dL (2017 09:02)    PT/INR - ( 2017 07:13 )   PT: 14.7 sec;   INR: 1.34 ratio             CULTURES:      Physical Examination:    General: No acute distress.      HEENT: Pupils equal, reactive to light.  Symmetric.    PULM: Clear to auscultation bilaterally, no significant sputum production    CVS: Regular rate and rhythm, no murmurs, rubs, or gallops    ABD: Soft, nondistended, nontender, normoactive bowel sounds, no masses    EXT: No edema, nontender Right hip bandaged. SLight tenderness both calves    SKIN: Warm and well perfused, no rashes noted.    NEURO: Alert, oriented, interactive, nonfocal    RADIOLOGY: ***    CRITICAL CARE TIME SPENT: ***

## 2017-11-17 VITALS
DIASTOLIC BLOOD PRESSURE: 52 MMHG | OXYGEN SATURATION: 96 % | RESPIRATION RATE: 18 BRPM | SYSTOLIC BLOOD PRESSURE: 97 MMHG | TEMPERATURE: 98 F | HEART RATE: 80 BPM

## 2017-11-17 LAB
ANION GAP SERPL CALC-SCNC: 7 MMOL/L — SIGNIFICANT CHANGE UP (ref 5–17)
BUN SERPL-MCNC: 11 MG/DL — SIGNIFICANT CHANGE UP (ref 7–23)
CALCIUM SERPL-MCNC: 8.1 MG/DL — LOW (ref 8.4–10.5)
CHLORIDE SERPL-SCNC: 105 MMOL/L — SIGNIFICANT CHANGE UP (ref 96–108)
CO2 SERPL-SCNC: 26 MMOL/L — SIGNIFICANT CHANGE UP (ref 22–31)
CREAT SERPL-MCNC: 0.76 MG/DL — SIGNIFICANT CHANGE UP (ref 0.5–1.3)
GLUCOSE SERPL-MCNC: 100 MG/DL — HIGH (ref 70–99)
HCT VFR BLD CALC: 32.9 % — LOW (ref 34.5–45)
HGB BLD-MCNC: 10.7 G/DL — LOW (ref 11.5–15.5)
INR BLD: 1.55 RATIO — HIGH (ref 0.88–1.16)
MCHC RBC-ENTMCNC: 32.6 GM/DL — SIGNIFICANT CHANGE UP (ref 32–36)
MCHC RBC-ENTMCNC: 33.5 PG — SIGNIFICANT CHANGE UP (ref 27–34)
MCV RBC AUTO: 102.7 FL — HIGH (ref 80–100)
PLATELET # BLD AUTO: 150 K/UL — SIGNIFICANT CHANGE UP (ref 150–400)
POTASSIUM SERPL-MCNC: 3.8 MMOL/L — SIGNIFICANT CHANGE UP (ref 3.5–5.3)
POTASSIUM SERPL-SCNC: 3.8 MMOL/L — SIGNIFICANT CHANGE UP (ref 3.5–5.3)
PROTHROM AB SERPL-ACNC: 17 SEC — HIGH (ref 9.8–12.7)
RBC # BLD: 3.2 M/UL — LOW (ref 3.8–5.2)
RBC # FLD: 12.8 % — SIGNIFICANT CHANGE UP (ref 10.3–14.5)
SODIUM SERPL-SCNC: 138 MMOL/L — SIGNIFICANT CHANGE UP (ref 135–145)
WBC # BLD: 9.5 K/UL — SIGNIFICANT CHANGE UP (ref 3.8–10.5)
WBC # FLD AUTO: 9.5 K/UL — SIGNIFICANT CHANGE UP (ref 3.8–10.5)

## 2017-11-17 PROCEDURE — 99233 SBSQ HOSP IP/OBS HIGH 50: CPT

## 2017-11-17 RX ORDER — WARFARIN SODIUM 2.5 MG/1
1 TABLET ORAL
Qty: 0 | Refills: 0 | COMMUNITY
Start: 2017-11-17

## 2017-11-17 RX ORDER — OXYCODONE HYDROCHLORIDE 5 MG/1
1 TABLET ORAL
Qty: 0 | Refills: 0 | COMMUNITY
Start: 2017-11-17

## 2017-11-17 RX ORDER — WARFARIN SODIUM 2.5 MG/1
5 TABLET ORAL ONCE
Qty: 0 | Refills: 0 | Status: DISCONTINUED | OUTPATIENT
Start: 2017-11-17 | End: 2017-11-17

## 2017-11-17 RX ORDER — POLYETHYLENE GLYCOL 3350 17 G/17G
17 POWDER, FOR SOLUTION ORAL
Qty: 0 | Refills: 0 | COMMUNITY
Start: 2017-11-17

## 2017-11-17 RX ORDER — CELECOXIB 200 MG/1
1 CAPSULE ORAL
Qty: 0 | Refills: 0 | COMMUNITY
Start: 2017-11-17

## 2017-11-17 RX ORDER — ENOXAPARIN SODIUM 100 MG/ML
80 INJECTION SUBCUTANEOUS
Qty: 0 | Refills: 0 | COMMUNITY
Start: 2017-11-17

## 2017-11-17 RX ORDER — ACETAMINOPHEN 500 MG
2 TABLET ORAL
Qty: 0 | Refills: 0 | COMMUNITY
Start: 2017-11-17 | End: 2017-11-30

## 2017-11-17 RX ORDER — SENNA PLUS 8.6 MG/1
2 TABLET ORAL
Qty: 0 | Refills: 0 | COMMUNITY
Start: 2017-11-17

## 2017-11-17 RX ORDER — DOCUSATE SODIUM 100 MG
1 CAPSULE ORAL
Qty: 0 | Refills: 0 | COMMUNITY
Start: 2017-11-17

## 2017-11-17 RX ORDER — PANTOPRAZOLE SODIUM 20 MG/1
1 TABLET, DELAYED RELEASE ORAL
Qty: 0 | Refills: 0 | COMMUNITY
Start: 2017-11-17

## 2017-11-17 RX ORDER — WARFARIN SODIUM 2.5 MG/1
1 TABLET ORAL
Qty: 0 | Refills: 0 | COMMUNITY

## 2017-11-17 RX ADMIN — Medication 100 MILLIGRAM(S): at 06:11

## 2017-11-17 RX ADMIN — CELECOXIB 200 MILLIGRAM(S): 200 CAPSULE ORAL at 08:33

## 2017-11-17 RX ADMIN — Medication 0.5 MILLIGRAM(S): at 08:31

## 2017-11-17 RX ADMIN — AMLODIPINE BESYLATE 5 MILLIGRAM(S): 2.5 TABLET ORAL at 06:11

## 2017-11-17 RX ADMIN — CELECOXIB 200 MILLIGRAM(S): 200 CAPSULE ORAL at 08:31

## 2017-11-17 RX ADMIN — OXYCODONE HYDROCHLORIDE 5 MILLIGRAM(S): 5 TABLET ORAL at 08:31

## 2017-11-17 RX ADMIN — ENOXAPARIN SODIUM 80 MILLIGRAM(S): 100 INJECTION SUBCUTANEOUS at 08:31

## 2017-11-17 RX ADMIN — Medication 75 MICROGRAM(S): at 06:12

## 2017-11-17 RX ADMIN — Medication 1000 MILLIGRAM(S): at 11:47

## 2017-11-17 RX ADMIN — PANTOPRAZOLE SODIUM 40 MILLIGRAM(S): 20 TABLET, DELAYED RELEASE ORAL at 06:12

## 2017-11-17 RX ADMIN — Medication 1000 MILLIGRAM(S): at 07:00

## 2017-11-17 RX ADMIN — Medication 1000 MILLIGRAM(S): at 06:11

## 2017-11-17 RX ADMIN — OXYCODONE HYDROCHLORIDE 5 MILLIGRAM(S): 5 TABLET ORAL at 09:01

## 2017-11-17 NOTE — PROGRESS NOTE ADULT - ASSESSMENT
Pt s/p THR with nearsyncope. Stable vital signs. Hx DVT with IVC filter, Factor V Leiden, but PE unlikely. Probably due to dehydration, meds, vasovagal.  Stable now, ambulating
81 y/o female,  s/p right THR.
83 y/o female,  s/p right THR.
81 y/o female,  s/p right THR.

## 2017-11-17 NOTE — PROGRESS NOTE ADULT - PROBLEM SELECTOR PLAN 1
ambulate  Fluids  Rehab
pain meds - oxycodone.  PT/OT.  DVT prophylaxis.  DVT ppx: [ ]ASA81 [ ] [id number] [ x] Lovenox [ x] Coumadin   [ ] Eliquis [  ] Heparin  Dispo:     Home [ ]     Acute Rehab [ ]     REINALDO [ x]     TBD [x ].
pain meds - oxycodone.  PT/OT.  DVT prophylaxis.  DVT ppx: [ ]ASA81 [ ] [id number] [ x] Lovenox [ x] Coumadin   [ ] Eliquis [  ] Heparin  Dispo:     Home [ ]     Acute Rehab [ ]     REINALDO [ x]     TBD [x ].
pain meds - oxycodone.  PT/OT.  DVT prophylaxis.  DVT ppx: [ ]ASA81 [ ] [id number] [ x] Lovenox [ x] Coumadin   [ ] Eliquis [  ] Heparin  Dispo:     Home [ ]     Acute Rehab [ ]     REINALDO [ x]     TBD [x ].

## 2017-11-17 NOTE — PROGRESS NOTE ADULT - PROBLEM SELECTOR PROBLEM 2
DVT (deep venous thrombosis)

## 2017-11-17 NOTE — PROGRESS NOTE ADULT - PROVIDER SPECIALTY LIST ADULT
Hospitalist
Orthopedics
Pharmacy
Critical Care

## 2017-11-17 NOTE — PROGRESS NOTE ADULT - SUBJECTIVE AND OBJECTIVE BOX
AS per Dr. Mcclure HO prophylaxis celebrex 200mg  po bid  x 21 days
ORTHOPEDIC ATTENDING PROGRESS NOTE  RUBINA GAUTHIER      82y Female                                                                                                                               POD #   1     STATUS POST:               Pre-Op Dx: Primary osteoarthritis of right hip    Post-Op Dx:  Primary osteoarthritis of right hip    Procedure: Arthroplasty of right hip                                                Pain (0-10):  Pt reports  Current Pain Management:  [ ] PCA   [x ] Po Analgesics [ ] IM /IV Anagesics     T(F): 97.9  HR: 80  BP: 185/75  RR: 18  SpO2: 97%                         11.7   7.8   )-----------( 154      ( 15 Nov 2017 07:22 )             35.7               Physical Exam :    -   Dressing changed sterile.   -   Wound C/D/I.   -   Distal Neurvascular status intact grossly.   -   Warm well perfused; capillary refill <3 seconds   -   (+)EHL/FHL   -   (+) Sensation to light touch  -   (-) Calf tenderness Bilaterally    A/P: 82y Female s/p Arthroplasty of right hip     -   Ortho Stable  -   Pain control   -   Medicine to follow  -   DVT ppx:     [ ]SCDs     [ ] ASA     [ ] Eliquis     [x ] Lovenox/Coumadin  -   Weight bearing status:  WBAT [x ]        PWB    [ ]     TTWB  [ ]      NWB  [ ]   -  Dispo:     Home [ ]     Acute Rehab [ ]     REINALDO [x ]     TBD [ ]
ORTHOPEDIC ATTENDING PROGRESS NOTE  RUBINA GAUTHIER      82y Female                                                                                                                               POD #  2      STATUS POST:               Pre-Op Dx: Primary osteoarthritis of right hip    Post-Op Dx:  Primary osteoarthritis of right hip    Procedure: Arthroplasty of right hip                                                Pain (0-10):  Pt reports  Current Pain Management:  [ ] PCA   [ ] Po Analgesics [ ] IM /IV Anagesics     T(F): 98  HR: 87  BP: 128/72  RR: 18  SpO2: 92%                         12.0   10.2  )-----------( 145      ( 16 Nov 2017 07:13 )             34.9             PT/INR - ( 16 Nov 2017 07:13 )   PT: 14.7 sec;   INR: 1.34 ratio           Physical Exam :    -   Dressing changed sterile.   -   Wound C/D/I.   -   Distal Neurvascular status intact grossly.   -   Warm well perfused; capillary refill <3 seconds   -   (+)EHL/FHL   -   (+) Sensation to light touch  -   (-) Calf tenderness Bilaterally    A/P: 82y Female s/p Arthroplasty of right hip     -   Ortho Stable  -   Pain control   -   Medicine to follow  -   DVT ppx:     [ ]SCDs     [ ] ASA     [ ] Eliquis     [x ] Lovenox/ Coumadin  -   Weight bearing status:  WBAT [x ]        PWB    [ ]     TTWB  [ ]      NWB  [ ]   -  Dispo:     Home [ ]     Acute Rehab [ ]     REINALDO [x ]     TBD [ ]
Patient is a 82y old  Female who presents with a chief complaint of right total hip replacement (15 Nov 2017 09:51)      INTERVAL HPI/OVERNIGHT EVENTS:  Pt is seen and examined.  c/o pain and anxiety early morning.  High BP most likly due to anxiety.      Pain Location & Control:     MEDICATIONS  (STANDING):  acetaminophen   Tablet. 1000 milliGRAM(s) Oral every 12 hours  atorvastatin 40 milliGRAM(s) Oral at bedtime  docusate sodium 100 milliGRAM(s) Oral three times a day  enoxaparin Injectable 30 milliGRAM(s) SubCutaneous every 12 hours  lactated ringers. 1000 milliLiter(s) (100 mL/Hr) IV Continuous <Continuous>  levothyroxine 75 MICROGram(s) Oral daily  pantoprazole    Tablet 40 milliGRAM(s) Oral before breakfast  warfarin 5 milliGRAM(s) Oral once    MEDICATIONS  (PRN):  ALPRAZolam 0.5 milliGRAM(s) Oral three times a day PRN anxiety  aluminum hydroxide/magnesium hydroxide/simethicone Suspension 30 milliLiter(s) Oral four times a day PRN Indigestion  HYDROmorphone  Injectable 0.5 milliGRAM(s) IV Push every 3 hours PRN Severe Pain  magnesium hydroxide Suspension 30 milliLiter(s) Oral daily PRN Constipation  ondansetron Injectable 4 milliGRAM(s) IV Push every 6 hours PRN Nausea and/or Vomiting  oxyCODONE    IR 5 milliGRAM(s) Oral every 3 hours PRN Mild Pain  oxyCODONE    IR 10 milliGRAM(s) Oral every 3 hours PRN Moderate Pain  polyethylene glycol 3350 17 Gram(s) Oral daily PRN Constipation  senna 2 Tablet(s) Oral at bedtime PRN Constipation      Allergies    malt (Hives; Rash)  penicillin (Hives; Rash)  shellfish (Hives; Rash)    Intolerances            Vital Signs Last 24 Hrs  T(C): 36.7 (15 Nov 2017 11:25), Max: 37.2 (15 Nov 2017 03:15)  T(F): 98.1 (15 Nov 2017 11:25), Max: 98.9 (15 Nov 2017 03:15)  HR: 67 (15 Nov 2017 11:25) (59 - 85)  BP: 179/79 (15 Nov 2017 11:25) (120/66 - 185/75)  BP(mean): --  RR: 18 (15 Nov 2017 11:25) (14 - 20)  SpO2: 98% (15 Nov 2017 11:25) (94% - 100%)        LABS:                        11.7   7.8   )-----------( 154      ( 15 Nov 2017 07:22 )             35.7     15 Nov 2017 07:22    136    |  101    |  9      ----------------------------<  125    3.7     |  27     |  0.75     Ca    8.7        15 Nov 2017 07:22      PT/INR - ( 15 Nov 2017 07:22 )   PT: 13.1 sec;   INR: 1.20 ratio                   RADIOLOGY & ADDITIONAL TESTS:    Imaging Personally Reviewed:  [ ] YES  [ ] NO    Consultant(s) Notes Reviewed:  [ x] xYES  [ ] NO    Care Discussed with Consultants/Other Providers [ x] YES  [ ] NO
Patient is a 82y old  Female who presents with a chief complaint of right total hip replacement (15 Nov 2017 09:51)      INTERVAL HPI/OVERNIGHT EVENTS:  Pt is seen and examined.  pT had rapid response today. She felt dizzy and almost passed out while getting out of bed. felt better after lying on bed.  Denies any chest pain, palpitation, headache, nausea, sob, cough or abdominal pain.    Pain Location & Control:     MEDICATIONS  (STANDING):  acetaminophen   Tablet. 1000 milliGRAM(s) Oral every 12 hours  amLODIPine   Tablet 5 milliGRAM(s) Oral daily  atorvastatin 40 milliGRAM(s) Oral at bedtime  celecoxib 200 milliGRAM(s) Oral two times a day before meals  docusate sodium 100 milliGRAM(s) Oral three times a day  enoxaparin Injectable 80 milliGRAM(s) SubCutaneous every 12 hours  levothyroxine 75 MICROGram(s) Oral daily  pantoprazole    Tablet 40 milliGRAM(s) Oral before breakfast  potassium chloride    Tablet ER 40 milliEquivalent(s) Oral once  sodium chloride 0.9%. 1000 milliLiter(s) (100 mL/Hr) IV Continuous <Continuous>    MEDICATIONS  (PRN):  ALPRAZolam 0.5 milliGRAM(s) Oral three times a day PRN anxiety  aluminum hydroxide/magnesium hydroxide/simethicone Suspension 30 milliLiter(s) Oral four times a day PRN Indigestion  bisacodyl Suppository 10 milliGRAM(s) Rectal daily PRN If no bowel movement by POD#2  HYDROmorphone  Injectable 0.5 milliGRAM(s) IV Push every 3 hours PRN Severe Pain  magnesium hydroxide Suspension 30 milliLiter(s) Oral daily PRN Constipation  ondansetron Injectable 4 milliGRAM(s) IV Push every 6 hours PRN Nausea and/or Vomiting  oxyCODONE    IR 5 milliGRAM(s) Oral every 3 hours PRN Mild Pain  oxyCODONE    IR 10 milliGRAM(s) Oral every 3 hours PRN Moderate Pain  polyethylene glycol 3350 17 Gram(s) Oral daily PRN Constipation  senna 2 Tablet(s) Oral at bedtime PRN Constipation      Allergies    malt (Hives; Rash)  penicillin (Hives; Rash)  shellfish (Hives; Rash)    Intolerances            Vital Signs Last 24 Hrs  T(C): 36.8 (16 Nov 2017 09:07), Max: 37.2 (15 Nov 2017 15:05)  T(F): 98.2 (16 Nov 2017 09:07), Max: 99 (15 Nov 2017 23:00)  HR: 89 (16 Nov 2017 09:07) (67 - 98)  BP: 130/69 (16 Nov 2017 09:07) (128/72 - 188/77)  BP(mean): --  RR: 16 (16 Nov 2017 09:07) (16 - 18)  SpO2: 98% (16 Nov 2017 09:07) (92% - 98%)        LABS:                        12.0   10.2  )-----------( 145      ( 16 Nov 2017 07:13 )             34.9     16 Nov 2017 07:13    137    |  102    |  10     ----------------------------<  116    3.3     |  27     |  0.81     Ca    8.9        16 Nov 2017 07:13      PT/INR - ( 16 Nov 2017 07:13 )   PT: 14.7 sec;   INR: 1.34 ratio             CAPILLARY BLOOD GLUCOSE      POCT Blood Glucose.: 179 mg/dL (16 Nov 2017 09:02)        Cultures          RADIOLOGY & ADDITIONAL TESTS:    Imaging Personally Reviewed:  [ ] YES  [ ] NO    Consultant(s) Notes Reviewed:  [x ] YES  [ ] NO    Care Discussed with Consultants/Other Providers [ x] YES  [ ] NO
Patient is a 82y old  Female who presents with a chief complaint of right total hip replacement (15 Nov 2017 09:51)      INTERVAL HPI/OVERNIGHT EVENTS:  Pt is seen and examined.  pain is controlled.  feels better, No new complaints.    Pain Location & Control:     MEDICATIONS  (STANDING):  acetaminophen   Tablet. 1000 milliGRAM(s) Oral every 12 hours  amLODIPine   Tablet 5 milliGRAM(s) Oral daily  atorvastatin 40 milliGRAM(s) Oral at bedtime  celecoxib 200 milliGRAM(s) Oral two times a day before meals  docusate sodium 100 milliGRAM(s) Oral three times a day  enoxaparin Injectable 80 milliGRAM(s) SubCutaneous every 12 hours  levothyroxine 75 MICROGram(s) Oral daily  pantoprazole    Tablet 40 milliGRAM(s) Oral before breakfast  sodium chloride 0.9%. 1000 milliLiter(s) (100 mL/Hr) IV Continuous <Continuous>  warfarin 5 milliGRAM(s) Oral once    MEDICATIONS  (PRN):  ALPRAZolam 0.5 milliGRAM(s) Oral three times a day PRN anxiety  aluminum hydroxide/magnesium hydroxide/simethicone Suspension 30 milliLiter(s) Oral four times a day PRN Indigestion  bisacodyl Suppository 10 milliGRAM(s) Rectal daily PRN If no bowel movement by POD#2  HYDROmorphone  Injectable 0.5 milliGRAM(s) IV Push every 3 hours PRN Severe Pain  magnesium hydroxide Suspension 30 milliLiter(s) Oral daily PRN Constipation  ondansetron Injectable 4 milliGRAM(s) IV Push every 6 hours PRN Nausea and/or Vomiting  oxyCODONE    IR 5 milliGRAM(s) Oral every 3 hours PRN Mild Pain  oxyCODONE    IR 10 milliGRAM(s) Oral every 3 hours PRN Moderate Pain  polyethylene glycol 3350 17 Gram(s) Oral daily PRN Constipation  senna 2 Tablet(s) Oral at bedtime PRN Constipation      Allergies    malt (Hives; Rash)  penicillin (Hives; Rash)  shellfish (Hives; Rash)    Intolerances            Vital Signs Last 24 Hrs  T(C): 36.6 (17 Nov 2017 07:30), Max: 37.3 (17 Nov 2017 03:14)  T(F): 97.8 (17 Nov 2017 07:30), Max: 99.2 (17 Nov 2017 03:14)  HR: 86 (17 Nov 2017 07:30) (83 - 92)  BP: 129/69 (17 Nov 2017 07:30) (106/55 - 147/74)  BP(mean): --  RR: 16 (17 Nov 2017 07:30) (14 - 16)  SpO2: 95% (17 Nov 2017 07:30) (92% - 97%)        LABS:                        10.7   9.5   )-----------( 150      ( 17 Nov 2017 08:28 )             32.9     17 Nov 2017 08:28    138    |  105    |  11     ----------------------------<  100    3.8     |  26     |  0.76     Ca    8.1        17 Nov 2017 08:28      PT/INR - ( 17 Nov 2017 08:28 )   PT: 17.0 sec;   INR: 1.55 ratio                     Cultures          RADIOLOGY & ADDITIONAL TESTS:    Imaging Personally Reviewed:  [ ] YES  [ ] NO    Consultant(s) Notes Reviewed:  [ ] YES  [ ] NO    Care Discussed with Consultants/Other Providers [x ] YES  [ ] NO
Presurgical evaluation:  Allergies:  penicillin: Hives, Rash  shellfish: Hives, Rash  malt: Hives, Rash    Home Medications:   · 	Rosuvastatin (Crestor®) 10 mg once a day (at bedtime)  · 	Levothyroxine (Synthroid®) 75 mcg once a day  · 	alprazolam 0.5 mg 3 times a day, As Needed  · 	Warfarin (Coumadin®) 2.5 mg once a day  ·	Aflibercept (Eylea®) 2mg intravitreally Q4W  · 	Therapeutic Multiple Vitamins once a day  · 	coral calcium once a day    Past Medical History:  Anxiety    Asthma    DVT (deep venous thrombosis)  approx 2005 - left leg  Dyslipidemia    Factor V Leiden    History of superior vena cava filter placement    Hypothyroid    Migraine headache    Osteoarthritis    Stroke  left retinal stroke- under care of retina specialist  Tremor     Past Surgical History:  H/O superior vena cava filter placement  2006  S/P wrist surgery  1997, left  Status post total replacement of left hip  2014    PST values of interest:  SCr 0.90 mg/dL  LFTs WNL  QTc 411ms (WNL)    Recommendations:  1.	TOPICAL TXA  2.	Acetaminophen and celecoxib for multimodal pain management  3.	Vancomycin 1000mg (based on PST weight) over 60 minutes preop, repeat x 1 dose 12 hours later.  4.	VTE prophylaxis: Enoxaparin 30mg q12h POD1 (approved by Dr. Mcclure), then Enoxaparin 80mg q12h until INR > 2.0. Restart Warfarin on evening of POD1, after patient has begun enoxaparin bridging. Patient is high risk for postop thrombus (Caprini 19), and warfarin monotherapy with enoxaparin bridging will increase this risk.
RUBINA GAUTHIER                                                                [id number]                                                      Allergies---malt (Hives; Rash)  penicillin (Hives; Rash)  shellfish (Hives; Rash)         Pt is a 82y year old Female s/p left THR.   Pain is 2/10.   Tolerating the diet.   The patient is A&O x 3, resting comfortably in bed, with no complaints.   Denies chest pain / shortness of breath / dyspnea / nausea / vomiting / headaches or light headed ness.       Vital Signs Last 24 Hrs  T(F): 98.2 (11-16-17 @ 09:07), Max: 99 (11-15-17 @ 23:00)  HR: 89 (11-16-17 @ 09:07)  BP: 130/69 (11-16-17 @ 09:07)  RR: 16 (11-16-17 @ 09:07)  SpO2: 98% (11-16-17 @ 09:07)    I&O's Detail        PE:   Right Lower Extremity:   Dressing is C/D/I.   The dressing was changed with no complications.   The wound is C/D/I.   The wound is closed with sutures.   NO redness, swelling, heat, discharge or any other evidence of infection superficial or deep is noted.   NVI.   Sensation intact to light touch distally.   No gross evidence of motor deficit.   EHL/FHL/TA intact.   +2 DP/PT pulses.   Capillary refill < 2 seconds.   Toes are pink and mobile.   + b/l calf tenderness, however pt does report of chronic leg tenderness/pain.   No evidence of impending compartment syndrome.   PAS on.    Add on-    several moments ago the pt vasovagal while in the upright position.   Currently she is feeling better. Ekg and exam is negative                             12.0   10.2  )--------------( 145                          11-16-17 @ 07:13               34.9       137   |  102  |  10  -----------------------------<  116                  11-16-17 @ 07:13  3.3    |  27    |  0.81    Ca    8.9                A:   Pt is a 82y year old Female S/P right total hip replacement, Post Op Day #2        Plan:    - Follow up with Medicine    - OOB with PT/OT   - DVT ppx = PAS +  enoxaparin Injectable 80 milliGRAM(s) SubCutaneous every 12 hours   - Hip dislocation precautions   - Pain control    - Incentive spirometry   - Labs in A.M.   - close monitoring   - discharge on hold for today                                                                                                                                                                           Michael ZULETA
PULMONARY/CRITICAL CARE      INTERVAL HPI/OVERNIGHT EVENTS:    82y FemaleHPI:  Doing well. Slight dizziness on ambulating. No sob, cp.        PAST MEDICAL & SURGICAL HISTORY:  Tremor  Stroke: left retinal stroke- under care of retina specialist  Migraine headache  Primary osteoarthritis of right hip  DVT (deep venous thrombosis): approx  - left leg  Asthma  Osteoarthritis: left hip  Factor V Leiden  Anxiety  Hypothyroid  Dyslipidemia  History of superior vena cava filter placement  Status post total replacement of left hip:   H/O superior vena cava filter placement:   S/P  section:   S/P wrist surgery: , left  S/P appendectomy: 65 ya        ICU Vital Signs Last 24 Hrs  T(C): 36.6 (2017 07:30), Max: 37.3 (2017 03:14)  T(F): 97.8 (2017 07:30), Max: 99.2 (2017 03:14)  HR: 86 (2017 07:30) (83 - 92)  BP: 129/69 (2017 07:30) (106/55 - 152/66)  BP(mean): --  ABP: --  ABP(mean): --  RR: 16 (2017 07:30) (14 - 16)  SpO2: 95% (2017 07:30) (92% - 97%)    Qtts:     I&O's Summary    2017 07:01  -  2017 07:00  --------------------------------------------------------  IN: 900 mL / OUT: 550 mL / NET: 350 mL            REVIEW OF SYSTEMS:    CONSTITUTIONAL: No fever, weight loss, or fatigue  EYES: No eye pain, visual disturbances, or discharge  ENMT:  No difficulty hearing, tinnitus, vertigo; No sinus or throat pain  NECK: No pain or stiffness  BREASTS: No pain, masses, or nipple discharge  RESPIRATORY: No cough, wheezing, chills or hemoptysis; No shortness of breath  CARDIOVASCULAR: No chest pain, palpitations, dizziness, or leg swelling  GASTROINTESTINAL: No abdominal or epigastric pain. No nausea, vomiting, or hematemesis; No diarrhea or constipation. No melena or hematochezia.  GENITOURINARY: No dysuria, frequency, hematuria, or incontinence  NEUROLOGICAL: No headaches, memory loss, loss of strength, numbness, or tremors  SKIN: No itching, burning, rashes, or lesions   LYMPH NODES: No enlarged glands  ENDOCRINE: No heat or cold intolerance; No hair loss  MUSCULOSKELETAL: right hip  joint pain or swelling; No muscle, back, or extremity pain, no calf tenderness  PSYCHIATRIC: No depression, anxiety, mood swings, or difficulty sleeping  HEME/LYMPH: No easy bruising, or bleeding gums  ALLERGY AND IMMUNOLOGIC: No hives or eczema      PHYSICAL EXAM:    GENERAL: NAD, well-groomed, well-developed, NAD  HEAD:  Atraumatic, Normocephalic  EYES: EOMI, PERRLA, conjunctiva and sclera clear  ENMT: No tonsillar erythema, exudates, or enlargement; Moist mucous membranes, Good dentition, No lesions  NECK: Supple, No JVD, Normal thyroid  NERVOUS SYSTEM:  Alert & Oriented X3, Good concentration; Motor Strength 5/5 B/L upper and lower extremities  CHEST/LUNG: Clear to percussion bilaterally; No rales, rhonchi, wheezing, or rubs  HEART: Regular rate and rhythm; No murmurs, rubs, or gallops  ABDOMEN: Soft, Nontender, Nondistended; Bowel sounds present  EXTREMITIES:  2+ Peripheral Pulses, No clubbing, cyanosis, or edema  Right hip bandaged.a  LYMPH: No lymphadenopathy noted  SKIN: No rashes or lesions        LABS:                        12.0   10.2  )-----------( 145      ( 2017 07:13 )             34.9     -17    138  |  105  |  11  ----------------------------<  100<H>  3.8   |  26  |  0.76    Ca    8.1<L>      2017 08:28      PT/INR - ( 2017 08:28 )   PT: 17.0 sec;   INR: 1.55 ratio               vanco through     RADIOLOGY & ADDITIONAL STUDIES:      CRITICAL CARE TIME SPENT:

## 2017-11-17 NOTE — PROGRESS NOTE ADULT - PROBLEM SELECTOR PROBLEM 4
Asthma, unspecified asthma severity, unspecified whether complicated, unspecified whether persistent
Anxiety

## 2017-11-17 NOTE — PROGRESS NOTE ADULT - PROBLEM SELECTOR PLAN 2
Coumadinize
h/o Lower Ext DVT.  on coumadin

## 2017-11-17 NOTE — PROGRESS NOTE ADULT - PROBLEM SELECTOR PROBLEM 3
Acquired hypothyroidism
Asthma, unspecified asthma severity, unspecified whether complicated, unspecified whether persistent

## 2017-11-17 NOTE — PROGRESS NOTE ADULT - PROBLEM SELECTOR PROBLEM 1
Near syncope
Aftercare following right hip joint replacement surgery

## 2017-11-17 NOTE — PROGRESS NOTE ADULT - ATTENDING COMMENTS
F/U in the office in 2 weeks.
Pt is medically stable for d/c today.
critical time spend- 60 minutes.

## 2017-11-29 ENCOUNTER — APPOINTMENT (OUTPATIENT)
Dept: SURGERY | Facility: ASSISTED LIVING FACILITY | Age: 82
End: 2017-11-29
Payer: MEDICARE

## 2017-11-29 PROCEDURE — 99304 1ST NF CARE SF/LOW MDM 25: CPT

## 2017-12-19 PROCEDURE — 97116 GAIT TRAINING THERAPY: CPT

## 2017-12-19 PROCEDURE — 97110 THERAPEUTIC EXERCISES: CPT

## 2017-12-19 PROCEDURE — 80048 BASIC METABOLIC PNL TOTAL CA: CPT

## 2017-12-19 PROCEDURE — 88305 TISSUE EXAM BY PATHOLOGIST: CPT

## 2017-12-19 PROCEDURE — 97535 SELF CARE MNGMENT TRAINING: CPT

## 2017-12-19 PROCEDURE — 88311 DECALCIFY TISSUE: CPT

## 2017-12-19 PROCEDURE — 73502 X-RAY EXAM HIP UNI 2-3 VIEWS: CPT

## 2017-12-19 PROCEDURE — C1776: CPT

## 2017-12-19 PROCEDURE — 85027 COMPLETE CBC AUTOMATED: CPT

## 2017-12-19 PROCEDURE — 85018 HEMOGLOBIN: CPT

## 2017-12-19 PROCEDURE — 97530 THERAPEUTIC ACTIVITIES: CPT

## 2017-12-19 PROCEDURE — 85610 PROTHROMBIN TIME: CPT

## 2017-12-19 PROCEDURE — 82962 GLUCOSE BLOOD TEST: CPT

## 2017-12-19 PROCEDURE — 70450 CT HEAD/BRAIN W/O DYE: CPT

## 2017-12-19 PROCEDURE — C1889: CPT

## 2017-12-19 PROCEDURE — 94664 DEMO&/EVAL PT USE INHALER: CPT

## 2017-12-19 PROCEDURE — 97161 PT EVAL LOW COMPLEX 20 MIN: CPT

## 2017-12-19 PROCEDURE — 93005 ELECTROCARDIOGRAM TRACING: CPT

## 2018-07-16 PROBLEM — I82.409 ACUTE EMBOLISM AND THROMBOSIS OF UNSPECIFIED DEEP VEINS OF UNSPECIFIED LOWER EXTREMITY: Chronic | Status: ACTIVE | Noted: 2017-10-26

## 2018-08-07 PROBLEM — G43.909 MIGRAINE, UNSPECIFIED, NOT INTRACTABLE, WITHOUT STATUS MIGRAINOSUS: Chronic | Status: ACTIVE | Noted: 2017-10-26

## 2018-08-07 PROBLEM — M16.11 UNILATERAL PRIMARY OSTEOARTHRITIS, RIGHT HIP: Chronic | Status: ACTIVE | Noted: 2017-10-26

## 2018-08-07 PROBLEM — R25.1 TREMOR, UNSPECIFIED: Chronic | Status: ACTIVE | Noted: 2017-10-26

## 2018-08-07 PROBLEM — I63.9 CEREBRAL INFARCTION, UNSPECIFIED: Chronic | Status: ACTIVE | Noted: 2017-10-26

## 2018-09-17 ENCOUNTER — APPOINTMENT (OUTPATIENT)
Dept: ORTHOPEDIC SURGERY | Facility: CLINIC | Age: 83
End: 2018-09-17
Payer: MEDICARE

## 2018-09-17 DIAGNOSIS — Z87.39 PERSONAL HISTORY OF OTHER DISEASES OF THE MUSCULOSKELETAL SYSTEM AND CONNECTIVE TISSUE: ICD-10-CM

## 2018-09-17 DIAGNOSIS — Z87.09 PERSONAL HISTORY OF OTHER DISEASES OF THE RESPIRATORY SYSTEM: ICD-10-CM

## 2018-09-17 DIAGNOSIS — Z82.61 FAMILY HISTORY OF ARTHRITIS: ICD-10-CM

## 2018-09-17 DIAGNOSIS — Z84.89 FAMILY HISTORY OF OTHER SPECIFIED CONDITIONS: ICD-10-CM

## 2018-09-17 PROCEDURE — 99214 OFFICE O/P EST MOD 30 MIN: CPT

## 2018-09-17 PROCEDURE — 73502 X-RAY EXAM HIP UNI 2-3 VIEWS: CPT

## 2018-09-17 RX ORDER — FLUTICASONE PROPIONATE AND SALMETEROL XINAFOATE 230; 21 UG/1; UG/1
230-21 AEROSOL, METERED RESPIRATORY (INHALATION)
Refills: 0 | Status: ACTIVE | COMMUNITY

## 2018-09-17 RX ORDER — ROSUVASTATIN CALCIUM 5 MG/1
TABLET, FILM COATED ORAL
Refills: 0 | Status: ACTIVE | COMMUNITY

## 2018-09-17 RX ORDER — LEVOTHYROXINE SODIUM 137 UG/1
TABLET ORAL
Refills: 0 | Status: ACTIVE | COMMUNITY

## 2018-10-11 ENCOUNTER — APPOINTMENT (OUTPATIENT)
Dept: ORTHOPEDIC SURGERY | Facility: CLINIC | Age: 83
End: 2018-10-11
Payer: MEDICARE

## 2018-10-11 VITALS — WEIGHT: 160 LBS | HEIGHT: 66 IN | BODY MASS INDEX: 25.71 KG/M2

## 2018-10-11 PROCEDURE — 99214 OFFICE O/P EST MOD 30 MIN: CPT

## 2018-11-19 ENCOUNTER — APPOINTMENT (OUTPATIENT)
Dept: ORTHOPEDIC SURGERY | Facility: CLINIC | Age: 83
End: 2018-11-19
Payer: MEDICARE

## 2018-11-19 VITALS — WEIGHT: 160 LBS | BODY MASS INDEX: 25.71 KG/M2 | HEIGHT: 66 IN

## 2018-11-19 DIAGNOSIS — M16.0 BILATERAL PRIMARY OSTEOARTHRITIS OF HIP: ICD-10-CM

## 2018-11-19 DIAGNOSIS — M47.816 SPONDYLOSIS W/OUT MYELOPATHY OR RADICULOPATHY, LUMBAR REGION: ICD-10-CM

## 2018-11-19 PROCEDURE — 99213 OFFICE O/P EST LOW 20 MIN: CPT

## 2019-07-31 NOTE — DISCHARGE NOTE ADULT - SIZE THE SAME. CHANGES IN THE AMOUNT YOU EAT CAN AFFECT YOUR PT/INR BLOOD TEST. CONTACT YOUR DOCTOR BEFORE MAKING ANY MAJOR CHANGES IN YOUR DIET. LIMIT YOUR ALCOHOL INTAKE.
----- Message from Jayna Hanson sent at 7/31/2019 12:06 PM CDT -----  Contact: [phone]  Patient stated leaving urine cup at the  her sister will  . Please call and advise, Thanks     Statement Selected

## 2020-08-17 NOTE — PATIENT PROFILE ADULT. - NS PRO AD PATIENT TYPE
Patient declined group this morning secondary to being discharged.   Health Care Proxy (HCP)/Living Will

## 2020-08-26 NOTE — PATIENT PROFILE ADULT. - NS PRO CONTRA FLU 1
**Zostavax: check with your pharmacy.    If you were PLEASED with your appointment with me today and your clinical experience, please fill out the survey requesting it at the .  And if any time you are mailed a survey to complete, please do so, as this helps my national survey input.     Thank you for your time in advance.    Dr. Lourdse Pham  [facility] Page:  Dr. [facility]          Please check your lab results via [employer] 48 hrs after you get labs done or please call if you have not received your test results in 2 weeks of them being completed. Whenever you view your [employer] labs and your labs are NOT within the normal values,  please send me an e-mail so we can discuss your labs sooner.      If any MRIs/CTs/ultrasounds/Xrays were ordered, please call our office for results the next business day.    [employer] is a powerful new Internet tool that is quick, convenient and confidential.  With [employer], you can view your results faster; communicate on-line with your [facility] physician's office; schedule many types of appointments; and find helpful healthcare links.    **Visit [email or web address] soon and often.  Sign up, take a quick tour, view important information, and stay in touch with [facility].  We're There When You Need Us    Multivitamin/Adequate Vitamin D3 2000 IU daily and Calcium 500 mg twice daily (dietary or supplement)  Starting at 40 (unless otherwise discussed w/ me), Yearly Mammogram:  please call [phone] to schedule  Dental vist every 6 months or as directed.  Optometry every 2 years or as directed.  Healthy eating and at least 150 mins of weekly Exercise  Seatbelts always.   Condoms always as applicable: single/any risk for sexually transmitted infections.  COLOGUARD ordered 2019  Bone Density due 2021  please call [phone] to schedule      MyPlate.gov Smart Tracker     My Fitness pal Alex with smartphone      Medication Cost  Resource:    Call  [facility]:          FREE LIST OF MEDS: inquire.         $25 Gift card for each new script.     [employer] and [employer]:  A great way to save $ for prescription and over-the-counter medicines.   Consider these pharmacies: [facility], [facility], [facility]    [employer]:  Can get free meds when  No insurance/your insurance doesn't cover them.        **Go directly by Internet to the medication's specific pharmaceutical company: may have the BEST discounts.      Fiber 25 grams daily (women)  Cholesterol <300 mg daily,     Exercise 150 mins weekly exercise.     3 pieces of fruit and 1 plate of veggies daily.     Egg Substitutes preferred (1 egg = 200 mg).    Fiber One honey cluster/raisin/caramel cereal:  Fiber 11-13 grams/cup.    150 mins of weekly exercise and healthy eating.      Medicare Wellness Visit  Plan for Preventive Care      An important way to stay healthy is to use preventive services provided by doctors and health care providers.  Preventive services can find health problems early when treatment works best and can keep you from getting certain diseases or illnesses.  Medicare pays for many preventive services to help keep you healthy. To complete your personal preventive care plan, you are in need of the following Health Maintenance screening services. The next due date is listed after the specific service.        Preventive Care for Women and Men    Cardiovascular Screening:  · Blood tests for cholesterol, lipid and triglyceride levels. High levels increase your risk for heart disease and stroke. High levels can be treated with medications, diet and exercise. Lowering your levels can help keep your heart and blood vessels healthy.  Your provider will order these tests if necessary.    · An xray to detect if you have an abdominal aortic aneurysm (AAA).  Annually 9,000 deaths in the [place] are AAA-related.  You may have no symptoms; as many as 1 in 3 will rupture if left  untreated.  Early diagnosis allows for more effective treatment and cure.  If you have a family history of AAA or are a male age 65-75 who has smoked, you are at higher risk of an AAA.  Your provider can order this test if needed.    Colorectal Screening:  · There are several tests to check for colorectal cancer. You and your doctor will discuss what test is best for you and when to have it done.   · Screening Colonoscopy: exam of the entire colon, seen through a flexible lighted tube.  · Flexible Sigmoidoscopy: exam of the sigmoid portion (last third) of the colon and rectum, seen through a flexible lighted tube.  · Cologuard DNA stool test: a sample of your stool is used to screen for cancer and unseen blood in your stool.  · Fecal Occult Blood Test: a sample of your stool is studied to find any unseen blood    Flu Shot:  · An immunization that helps to prevent influenza. You should get this every year. The best time to get the shot is in the fall.    Pneumococcal Shot:  · An immunization that helps prevent several types of pneumonia. There are now 2 recommended vaccines: previously recommended vaccine that covers 23 types of pneumonia and more recently recommended vaccine that covers 13 additional types of pneumonia.  They are given at least 11 months apart.  Booster immunizations are generally not needed.    Hepatitis B Shot:  · An immunization that helps to protect people from getting Hepatitis B. Hepatitis B is a virus that spreads through contact with infected blood or body fluids. Many people with the virus do not have symptoms.  The virus can lead to serious problems, such as liver disease. Some people are at higher risk than others. Your doctor will tell you if this shot is recommended for you.    Diabetes Screening:  · A test to measure glucose (sugar) in your blood called a fasting blood sugar. Fasting means you cannot have food or drink for at least 8 hours before the test. This test can detect diabetes  long before you may notice symptoms.    Glaucoma Screening:  · Glaucoma screening is performed by your eye doctor. The test measures the fluid pressure inside your eyes to detect if you have glaucoma     Hepatitis C Screening:  · A blood test to determine if you have hepatitis C virus, which attacks the liver and is a major cause of chronic liver disease.  Medicare will cover single screening for all adults born between 1945 & 1965, or high risk patients (current/past history of illicit injection drug use, blood transfusion prior to 1992).  High risk patients with ongoing illicit injection drug use can be screened annually.    Smoking and Tobacco-Use Cessation Counseling:  · Tobacco is the single greatest cause of disease and premature death in our country today. Medication and counseling together can increase a person’s chance of quitting for good.   · Medicare covers 2 quitting attempts per year, with 4 sessions per attempt (8 sessions in a twelve month period)    Preventive Care for Women    Screening Mammograms and Breast Exams:  · An x-ray of your breasts to check for breast cancer before you or your doctor may be able to feel it.  Breast cancer found early can usually be treated with success    Pelvic Exams and Pap Tests:  · An exam to check for cervical and vaginal cancer. A Pap test is a lab test in which cells are taken from your cervix and sent to the lab to detect signs of cervical cancer. When cancer of the cervix is found early, chances for a cure are good. Testing can generally end at age 65, or if a woman has a hysterectomy for a benign condition. A woman's primary care physician will advise more frequent testing if certain abnormalities are found.    Bone Mass Measurements:  · A painless x-ray measurement of your bone density to see if you are at risk for suffering a broken bone. Density refers to the amount of bone or how tightly the bone tissue is packed.    Preventive Care for Men    Prostate  Screening:  · PSA - a prostate cancer blood test. The [place] Preventive Services Task Force recommends against routine screening of healthy men with no signs or symptoms of prostate disease. Men should not ignore urinary symptoms, and discuss their family history with their doctor/provider.      Medicare pays for many preventive services to keep you healthy. For some of these services, you might have to pay a deductible, coinsurance, and / or copayment.  The amounts vary depending on the type of services you need and the kind of Medicare health plan you have.                         yes

## 2020-11-11 NOTE — PROGRESS NOTE ADULT - ENMT
Patient called and left message on refill line he wanted a refill on atorvastatin (LIPITOR) 40 MG tablet. A refill was sent on 11/9/2020 [facility].  I called and left a message telling him that a refill was sent and he needs to call . negative No oral lesions; no gross abnormalities

## 2022-12-06 ENCOUNTER — EMERGENCY (EMERGENCY)
Facility: HOSPITAL | Age: 87
LOS: 1 days | Discharge: HOME | End: 2022-12-06
Attending: STUDENT IN AN ORGANIZED HEALTH CARE EDUCATION/TRAINING PROGRAM
Payer: MEDICARE

## 2022-12-06 VITALS
TEMPERATURE: 98 F | DIASTOLIC BLOOD PRESSURE: 93 MMHG | SYSTOLIC BLOOD PRESSURE: 184 MMHG | HEIGHT: 65 IN | WEIGHT: 177.91 LBS | OXYGEN SATURATION: 95 % | RESPIRATION RATE: 22 BRPM | HEART RATE: 99 BPM

## 2022-12-06 DIAGNOSIS — Z96.642 PRESENCE OF LEFT ARTIFICIAL HIP JOINT: Chronic | ICD-10-CM

## 2022-12-06 LAB
ALBUMIN SERPL ELPH-MCNC: 3.9 G/DL — SIGNIFICANT CHANGE UP (ref 3.3–5)
ALP SERPL-CCNC: 78 U/L — SIGNIFICANT CHANGE UP (ref 40–120)
ALT FLD-CCNC: 14 U/L — SIGNIFICANT CHANGE UP (ref 10–45)
ANION GAP SERPL CALC-SCNC: 11 MMOL/L — SIGNIFICANT CHANGE UP (ref 5–17)
APTT BLD: 28.4 SEC — SIGNIFICANT CHANGE UP (ref 27.5–35.5)
AST SERPL-CCNC: 22 U/L — SIGNIFICANT CHANGE UP (ref 10–40)
BASOPHILS # BLD AUTO: 0.04 K/UL — SIGNIFICANT CHANGE UP (ref 0–0.2)
BASOPHILS NFR BLD AUTO: 0.7 % — SIGNIFICANT CHANGE UP (ref 0–2)
BILIRUB SERPL-MCNC: 0.4 MG/DL — SIGNIFICANT CHANGE UP (ref 0.2–1.2)
BUN SERPL-MCNC: 11 MG/DL — SIGNIFICANT CHANGE UP (ref 7–23)
CALCIUM SERPL-MCNC: 9.7 MG/DL — SIGNIFICANT CHANGE UP (ref 8.4–10.5)
CHLORIDE SERPL-SCNC: 105 MMOL/L — SIGNIFICANT CHANGE UP (ref 96–108)
CO2 SERPL-SCNC: 24 MMOL/L — SIGNIFICANT CHANGE UP (ref 22–31)
CREAT SERPL-MCNC: 0.83 MG/DL — SIGNIFICANT CHANGE UP (ref 0.5–1.3)
EGFR: 69 ML/MIN/1.73M2 — SIGNIFICANT CHANGE UP
EOSINOPHIL # BLD AUTO: 0.66 K/UL — HIGH (ref 0–0.5)
EOSINOPHIL NFR BLD AUTO: 12.2 % — HIGH (ref 0–6)
FLUAV AG NPH QL: SIGNIFICANT CHANGE UP
FLUBV AG NPH QL: SIGNIFICANT CHANGE UP
GLUCOSE SERPL-MCNC: 121 MG/DL — HIGH (ref 70–99)
HCT VFR BLD CALC: 40.8 % — SIGNIFICANT CHANGE UP (ref 34.5–45)
HGB BLD-MCNC: 13.2 G/DL — SIGNIFICANT CHANGE UP (ref 11.5–15.5)
IMM GRANULOCYTES NFR BLD AUTO: 0.2 % — SIGNIFICANT CHANGE UP (ref 0–0.9)
INR BLD: 1.5 RATIO — HIGH (ref 0.88–1.16)
LACTATE BLDV-MCNC: 1.4 MMOL/L — SIGNIFICANT CHANGE UP (ref 0.5–2)
LYMPHOCYTES # BLD AUTO: 1.72 K/UL — SIGNIFICANT CHANGE UP (ref 1–3.3)
LYMPHOCYTES # BLD AUTO: 31.7 % — SIGNIFICANT CHANGE UP (ref 13–44)
MCHC RBC-ENTMCNC: 32.4 GM/DL — SIGNIFICANT CHANGE UP (ref 32–36)
MCHC RBC-ENTMCNC: 32.8 PG — SIGNIFICANT CHANGE UP (ref 27–34)
MCV RBC AUTO: 101.5 FL — HIGH (ref 80–100)
MONOCYTES # BLD AUTO: 0.6 K/UL — SIGNIFICANT CHANGE UP (ref 0–0.9)
MONOCYTES NFR BLD AUTO: 11 % — SIGNIFICANT CHANGE UP (ref 2–14)
NEUTROPHILS # BLD AUTO: 2.4 K/UL — SIGNIFICANT CHANGE UP (ref 1.8–7.4)
NEUTROPHILS NFR BLD AUTO: 44.2 % — SIGNIFICANT CHANGE UP (ref 43–77)
NRBC # BLD: 0 /100 WBCS — SIGNIFICANT CHANGE UP (ref 0–0)
PLATELET # BLD AUTO: 338 K/UL — SIGNIFICANT CHANGE UP (ref 150–400)
POTASSIUM SERPL-MCNC: 4.8 MMOL/L — SIGNIFICANT CHANGE UP (ref 3.5–5.3)
POTASSIUM SERPL-SCNC: 4.8 MMOL/L — SIGNIFICANT CHANGE UP (ref 3.5–5.3)
PROT SERPL-MCNC: 7.4 G/DL — SIGNIFICANT CHANGE UP (ref 6–8.3)
PROTHROM AB SERPL-ACNC: 17.3 SEC — HIGH (ref 10.5–13.4)
RBC # BLD: 4.02 M/UL — SIGNIFICANT CHANGE UP (ref 3.8–5.2)
RBC # FLD: 13.6 % — SIGNIFICANT CHANGE UP (ref 10.3–14.5)
RSV RNA NPH QL NAA+NON-PROBE: SIGNIFICANT CHANGE UP
SARS-COV-2 RNA SPEC QL NAA+PROBE: SIGNIFICANT CHANGE UP
SODIUM SERPL-SCNC: 140 MMOL/L — SIGNIFICANT CHANGE UP (ref 135–145)
TROPONIN T, HIGH SENSITIVITY RESULT: 11 NG/L — SIGNIFICANT CHANGE UP (ref 0–51)
WBC # BLD: 5.43 K/UL — SIGNIFICANT CHANGE UP (ref 3.8–10.5)
WBC # FLD AUTO: 5.43 K/UL — SIGNIFICANT CHANGE UP (ref 3.8–10.5)

## 2022-12-06 PROCEDURE — 71045 X-RAY EXAM CHEST 1 VIEW: CPT

## 2022-12-06 PROCEDURE — 93971 EXTREMITY STUDY: CPT

## 2022-12-06 PROCEDURE — 85025 COMPLETE CBC W/AUTO DIFF WBC: CPT

## 2022-12-06 PROCEDURE — 87637 SARSCOV2&INF A&B&RSV AMP PRB: CPT

## 2022-12-06 PROCEDURE — 99284 EMERGENCY DEPT VISIT MOD MDM: CPT | Mod: FS

## 2022-12-06 PROCEDURE — 84484 ASSAY OF TROPONIN QUANT: CPT

## 2022-12-06 PROCEDURE — 71045 X-RAY EXAM CHEST 1 VIEW: CPT | Mod: 26

## 2022-12-06 PROCEDURE — 85610 PROTHROMBIN TIME: CPT

## 2022-12-06 PROCEDURE — 93971 EXTREMITY STUDY: CPT | Mod: 26,LT

## 2022-12-06 PROCEDURE — 93005 ELECTROCARDIOGRAM TRACING: CPT

## 2022-12-06 PROCEDURE — 83605 ASSAY OF LACTIC ACID: CPT

## 2022-12-06 PROCEDURE — 99285 EMERGENCY DEPT VISIT HI MDM: CPT | Mod: 25

## 2022-12-06 PROCEDURE — 85730 THROMBOPLASTIN TIME PARTIAL: CPT

## 2022-12-06 PROCEDURE — 80053 COMPREHEN METABOLIC PANEL: CPT

## 2022-12-06 PROCEDURE — 83880 ASSAY OF NATRIURETIC PEPTIDE: CPT

## 2022-12-06 PROCEDURE — 94640 AIRWAY INHALATION TREATMENT: CPT

## 2022-12-06 RX ORDER — IPRATROPIUM/ALBUTEROL SULFATE 18-103MCG
3 AEROSOL WITH ADAPTER (GRAM) INHALATION ONCE
Refills: 0 | Status: COMPLETED | OUTPATIENT
Start: 2022-12-06 | End: 2022-12-06

## 2022-12-06 RX ORDER — ACETAMINOPHEN 500 MG
650 TABLET ORAL ONCE
Refills: 0 | Status: COMPLETED | OUTPATIENT
Start: 2022-12-06 | End: 2022-12-06

## 2022-12-06 RX ADMIN — Medication 3 MILLILITER(S): at 23:06

## 2022-12-06 RX ADMIN — Medication 650 MILLIGRAM(S): at 21:27

## 2022-12-06 NOTE — ED PROVIDER NOTE - PROGRESS NOTE DETAILS
Gómez Watson MD: 87-year-old female with past medical history of DVT on Coumadin with factor V Leiden SVC filter was signed out pending a DVT study of the left lower extremity which she states he has been having some pain and swelling for the past few days.  Of note patient paused her Coumadin after having elevated INR of 6.2.  On labs here today INR is 1.5, DVT study is negative for any acute DVTs knee shows chronic changes.  On reevaluation patient endorsing slight improvement in symptoms and eager for discharge.  Patient educated on restarting her Coumadin and to follow-up with a primary care doctor.

## 2022-12-06 NOTE — ED PROVIDER NOTE - OBJECTIVE STATEMENT
88 y/o F with hx of tremors, DVT LLE (on 2.5mg Coumadin), Factor V Leiden, SVC filter, asthma (on neb and albuterol) presenting with left lower extremity pain and swelling starting 3 days ago. Pt recently advised to stop taking her Coumadin 4 days ago due to an elevated INR of 6.2, by PCP Dr. Ferrera. Denies chest pain, sob, nausea, vomiting. Reports increased pain with ambulation. Also c/o asthma exacerbation as she did not take her albuterol today.

## 2022-12-06 NOTE — ED PROVIDER NOTE - ATTENDING APP SHARED VISIT CONTRIBUTION OF CARE
Attending (Tommie Fishman D.O.):  I have personally seen and examined this patient. I have performed a substantive portion of the visit including all aspects of the medical decision making. Resident, fellow, student, and/or ACP note reviewed. I agree on the plan of care except where noted.    see mdm

## 2022-12-06 NOTE — ED PROVIDER NOTE - RAPID ASSESSMENT
87F w/ PMHx of external tremors (takes medication),blood clot of L leg (takes coumadin), bronchial infection, and asthma (takes nebulizer and albuterol) presents to ED c/o left leg pain and edema onset 2-3 days ago. Doctor instructed her to stop taking Coumadin secondary to elevated INR. Pt was on 2.5mg of Warfarin when last on Coumadin. PCP Dr. Ferrera and advised to go to ED. Pt last took for Coumadin four days ago and did not take any medication for the pain the past 6 hours. Pt is allergic to Pencillin. Pt denies any recent travel, fevers, drug use.     Patient was rapidly assessed via a telemedicine and/or role of Quick Triage Doctor; a limited history, physical exam and assessment was performed. The patient will be seen and further evaluated in the main emergency department. The remainder of care and evaluation will be conducted by the primary emergency medicine team. Receiving team will follow up on labs, imaging and serially reassess patient as indicated. All further decisions regarding patient care, evaluation and disposition are at the discretion of the receiving primary emergency department team. Seen by Wai Stover (MD) and Norma Maurice (Scribmary anne). 87F w/ PMHx of external tremors (takes medication),blood clot of L leg (takes coumadin), bronchial infection, and asthma (takes nebulizer and albuterol) presents to ED c/o left leg pain and edema onset 2-3 days ago. Doctor instructed her to stop taking Coumadin secondary to elevated INR. Pt was on 2.5mg of Warfarin when last on Coumadin. PCP Dr. Ferrera and advised to go to ED. Pt last took for Coumadin four days ago and did not take any medication for the pain the past 6 hours. Pt is allergic to Pencillin. Pt denies any recent travel, fevers, drug use.     Patient was rapidly assessed via a telemedicine and/or role of Quick Triage Doctor; a limited history, physical exam and assessment was performed. The patient will be seen and further evaluated in the main emergency department. The remainder of care and evaluation will be conducted by the primary emergency medicine team. Receiving team will follow up on labs, imaging and serially reassess patient as indicated. All further decisions regarding patient care, evaluation and disposition are at the discretion of the receiving primary emergency department team. Seen by Wai Stover (MD) and Norma Maurice (Scribe).    left lower extremity swollen in comparison to the right, mild discomfort secondary to pain,   Wai Stover MD, FACEP

## 2022-12-06 NOTE — ED ADULT NURSE NOTE - ISOLATION TYPE:
Mercy Hospital Ada – Ada in Boston Lying-In None

## 2022-12-06 NOTE — ED ADULT NURSE NOTE - NSICDXPASTMEDICALHX_GEN_ALL_CORE_FT
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

## 2022-12-06 NOTE — ED ADULT NURSE NOTE - NSICDXFAMILYHX_GEN_ALL_CORE_FT
FAMILY HISTORY:  Father  Still living? No  Family history of leukemia, Age at diagnosis: Age Unknown  Family history of myocardial infarction at age less than 60, Age at diagnosis: Age Unknown    Mother  Still living? No  Family history of dementia, Age at diagnosis: Age Unknown  Family history of DVT, Age at diagnosis: Age Unknown    Sibling  Still living? Yes, Estimated age: 71-80  Family history of asthma, Age at diagnosis: Age Unknown    Child  Still living? Yes, Estimated age: 51-60  Family history of factor V deficiency, Age at diagnosis: Age Unknown

## 2022-12-06 NOTE — ED ADULT TRIAGE NOTE - CHIEF COMPLAINT QUOTE
sent in from PMD for r/o DVT, stopped coumadin x4 days d/t elevated INR  now c/o L leg pain  hx of DVT in 2014, filter placed

## 2022-12-06 NOTE — ED PROVIDER NOTE - PATIENT PORTAL LINK FT
You can access the FollowMyHealth Patient Portal offered by [facility] by registering at the following website: [email or web address]. By joining [employer]’s FollowMyHealth portal, you will also be able to view your health information using other applications (apps) compatible with our system.

## 2022-12-06 NOTE — ED PROVIDER NOTE - NSFOLLOWUPINSTRUCTIONS_ED_ALL_ED_FT
You were seen in the emergency department for left lower leg pain.  Your evaluation here today did not reveal any new blood clots in your leg.  The pain can be secondary to muscle strain or other vascular issues.  If you notice any worsening swelling in the leg, weakness, numbness or tingling please and hesitate to return back to the ED for further evaluation.    You can use over-the-counter medication such as Tylenol 650 mg every 4 hours or Motrin 40 mg every 6 hours for pain control.    Your lab work and ultrasound report have been attached to discharge for work.    Patient and/or family/guardian understands anticipatory guidance and was given strict return and follow up precautions. The patient and/or family/guardian has been informed of the necessity to follow up with the PMD/Clinic/follow up as provided within 2-3 days, and the patient and/or family/guardian reports understanding of above with capacity and insight.

## 2022-12-06 NOTE — ED PROVIDER NOTE - MUSCULOSKELETAL, MLM
1+ pitting edema bilaterally, LLE slightly larger than RLE, +reproducible pain posterior left popliteal fossa

## 2022-12-06 NOTE — ED PROVIDER NOTE - NSICDXPASTMEDICALHX_GEN_ALL_CORE_FT
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     
Osmany

## 2022-12-06 NOTE — ED PROVIDER NOTE - NSICDXPASTSURGICALHX_GEN_ALL_CORE_FT
PAST SURGICAL HISTORY:  H/O superior vena cava filter placement     S/P appendectomy 66 ya    S/P  section     S/P wrist surgery , left    Status post total replacement of left hip

## 2022-12-06 NOTE — ED PROVIDER NOTE - CARE PLAN
1 Principal Discharge DX:	Leg pain   Principal Discharge DX:	Pain of left lower leg  Secondary Diagnosis:	Pleural effusion

## 2022-12-06 NOTE — ED PROVIDER NOTE - CLINICAL SUMMARY MEDICAL DECISION MAKING FREE TEXT BOX
Attending (Tommie Fishman D.O.):  87F hx of tremors on xanax 0.5mg prn, DVT LLE (on 2.5mg Coumadin), Factor V Leiden, SVC filter, asthma (on neb and albuterol) here for LLE pain, predom behind L knee, atruamatic. Baseline ambulation with cane, can still do it. bilateral leg swelling baseline with venous stasis changes. Recent bronchitis, has been taking albuterol for sxs with improvement. States had a cardiac w/u approx 2 years ago with Dr. Perez but does not know results. Hypertensive in ED room otherwise hemodynamically stable- >gradual drop in BP with repeat (pt states BP always high in hospital and gradually decreases which it did). Scant rales lung bases and scant anterior lung field exp wheeze. No acc muscle use. Soft abdomen, nondistended. No LE pitting edema, calves and legs large though symmetric. 2+ dp/PT/pop/fem pulses. Full range and str bilateral LE and UE. No audible cardiac murmurs. Qdoc labs reviewed, largely nonactionable. CXR with trace bilateral pleural effusions, likely 2/2 recent bronchitis/viral illness. No signs of clinical fluid overload elsewhere nor clinical anemia. No external signs of bleeding. Pending [facility] duplex. Will give neb as patient with scant exp wheeze on anterior lung auscultation. No current indication for diuresis as patient w/o report of valvulopathy and again not clinically overloaded. Will pend [facility] duplx -> signed out pending remainder of w/u, close reassessment for overall dispo

## 2022-12-06 NOTE — ED ADULT NURSE NOTE - OBJECTIVE STATEMENT
86 yo female presenting to the ED AAOx4 with complaints of left leg pain x 3 days. PMH left sided DVT on coumadin, Asthma, Pt states that she was told her INR was elevated x 4 days ago, was told to hold off on medication and per pt and brother at bedside, pt began to develop pain behind her left knee. On assessment, pt lower extremities are edematous Bilaterally +2, pulses present bilaterally. Pt lower extremities are cool to touch, no reddens noted. 88 yo female presenting to the ED AAOx4 with complaints of left leg pain x 3 days. PMH left sided DVT on coumadin, Asthma, Pt states that she was told her INR was elevated x 4 days ago, was told to hold off on medication and per pt and brother at bedside, pt began to develop pain behind her left knee. On assessment, pt lower extremities are edematous Bilaterally +2, pulses present bilaterally. Pt lower extremities are cool to touch bilaterally.Pt endorses worsening ability to ambulate on LLE. Denies headache, dizziness, vision changes, chest pain, shortness of breath, abdominal pain, nausea, vomiting, diarrhea, fevers, chills, dysuria, hematuria, recent illness travel or fall.

## 2022-12-07 VITALS
HEART RATE: 74 BPM | OXYGEN SATURATION: 96 % | SYSTOLIC BLOOD PRESSURE: 141 MMHG | DIASTOLIC BLOOD PRESSURE: 79 MMHG | RESPIRATION RATE: 18 BRPM

## 2022-12-07 NOTE — ED ADULT NURSE REASSESSMENT NOTE - NS ED NURSE REASSESS COMMENT FT1
Pt back from vascular, denies complaints of pain and discomfort, pt reports feeling less SOB, chest tightness after albuterol treatment, pt no longer wheezing on auscultation. Pt pending results

## 2024-01-25 NOTE — CONSULT NOTE ADULT - PROBLEM/RECOMMENDATION-4
Patient Specific Otc Recommendations (Will Not Stick From Patient To Patient): Hydrocortisone cream
DISPLAY PLAN FREE TEXT
Detail Level: Zone
DISPLAY PLAN FREE TEXT

## 2024-02-21 ENCOUNTER — INPATIENT (INPATIENT)
Facility: HOSPITAL | Age: 89
LOS: 5 days | Discharge: OTHER INSTITUTION | DRG: 871 | End: 2024-02-27
Attending: STUDENT IN AN ORGANIZED HEALTH CARE EDUCATION/TRAINING PROGRAM | Admitting: STUDENT IN AN ORGANIZED HEALTH CARE EDUCATION/TRAINING PROGRAM
Payer: MEDICARE

## 2024-02-21 VITALS
SYSTOLIC BLOOD PRESSURE: 106 MMHG | RESPIRATION RATE: 18 BRPM | DIASTOLIC BLOOD PRESSURE: 66 MMHG | HEIGHT: 66 IN | TEMPERATURE: 100 F | OXYGEN SATURATION: 95 % | WEIGHT: 169.09 LBS | HEART RATE: 86 BPM

## 2024-02-21 DIAGNOSIS — Z96.642 PRESENCE OF LEFT ARTIFICIAL HIP JOINT: Chronic | ICD-10-CM

## 2024-02-21 DIAGNOSIS — Z98.891 HISTORY OF UTERINE SCAR FROM PREVIOUS SURGERY: Chronic | ICD-10-CM

## 2024-02-21 DIAGNOSIS — A41.9 SEPSIS, UNSPECIFIED ORGANISM: ICD-10-CM

## 2024-02-21 DIAGNOSIS — Z96.641 PRESENCE OF RIGHT ARTIFICIAL HIP JOINT: Chronic | ICD-10-CM

## 2024-02-21 LAB
ALBUMIN SERPL ELPH-MCNC: 2.9 G/DL — LOW (ref 3.5–5)
ALP SERPL-CCNC: 75 U/L — SIGNIFICANT CHANGE UP (ref 40–120)
ALT FLD-CCNC: 22 U/L DA — SIGNIFICANT CHANGE UP (ref 10–60)
ANION GAP SERPL CALC-SCNC: 8 MMOL/L — SIGNIFICANT CHANGE UP (ref 5–17)
APPEARANCE UR: CLEAR — SIGNIFICANT CHANGE UP
APTT BLD: 40.7 SEC — HIGH (ref 24.5–35.6)
AST SERPL-CCNC: 58 U/L — HIGH (ref 10–40)
BASOPHILS # BLD AUTO: 0.04 K/UL — SIGNIFICANT CHANGE UP (ref 0–0.2)
BASOPHILS NFR BLD AUTO: 0.3 % — SIGNIFICANT CHANGE UP (ref 0–2)
BILIRUB SERPL-MCNC: 0.7 MG/DL — SIGNIFICANT CHANGE UP (ref 0.2–1.2)
BILIRUB UR-MCNC: NEGATIVE — SIGNIFICANT CHANGE UP
BUN SERPL-MCNC: 19 MG/DL — HIGH (ref 7–18)
CALCIUM SERPL-MCNC: 9.1 MG/DL — SIGNIFICANT CHANGE UP (ref 8.4–10.5)
CHLORIDE SERPL-SCNC: 102 MMOL/L — SIGNIFICANT CHANGE UP (ref 96–108)
CO2 SERPL-SCNC: 25 MMOL/L — SIGNIFICANT CHANGE UP (ref 22–31)
COLOR SPEC: YELLOW — SIGNIFICANT CHANGE UP
CREAT SERPL-MCNC: 1.12 MG/DL — SIGNIFICANT CHANGE UP (ref 0.5–1.3)
D DIMER BLD IA.RAPID-MCNC: 160 NG/ML DDU — SIGNIFICANT CHANGE UP
DIFF PNL FLD: NEGATIVE — SIGNIFICANT CHANGE UP
EGFR: 48 ML/MIN/1.73M2 — LOW
EOSINOPHIL # BLD AUTO: 0.03 K/UL — SIGNIFICANT CHANGE UP (ref 0–0.5)
EOSINOPHIL NFR BLD AUTO: 0.2 % — SIGNIFICANT CHANGE UP (ref 0–6)
GAS PNL BLDV: SIGNIFICANT CHANGE UP
GLUCOSE SERPL-MCNC: 135 MG/DL — HIGH (ref 70–99)
GLUCOSE UR QL: NEGATIVE MG/DL — SIGNIFICANT CHANGE UP
HCT VFR BLD CALC: 38 % — SIGNIFICANT CHANGE UP (ref 34.5–45)
HGB BLD-MCNC: 12.5 G/DL — SIGNIFICANT CHANGE UP (ref 11.5–15.5)
HIV 1 & 2 AB SERPL IA.RAPID: SIGNIFICANT CHANGE UP
IMM GRANULOCYTES NFR BLD AUTO: 0.5 % — SIGNIFICANT CHANGE UP (ref 0–0.9)
INR BLD: 5.21 RATIO — CRITICAL HIGH (ref 0.85–1.18)
KETONES UR-MCNC: ABNORMAL MG/DL
LACTATE SERPL-SCNC: 1.4 MMOL/L — SIGNIFICANT CHANGE UP (ref 0.7–2)
LEUKOCYTE ESTERASE UR-ACNC: NEGATIVE — SIGNIFICANT CHANGE UP
LIDOCAIN IGE QN: 25 U/L — SIGNIFICANT CHANGE UP (ref 13–75)
LYMPHOCYTES # BLD AUTO: 0.9 K/UL — LOW (ref 1–3.3)
LYMPHOCYTES # BLD AUTO: 7.1 % — LOW (ref 13–44)
MCHC RBC-ENTMCNC: 32.9 GM/DL — SIGNIFICANT CHANGE UP (ref 32–36)
MCHC RBC-ENTMCNC: 33.5 PG — SIGNIFICANT CHANGE UP (ref 27–34)
MCV RBC AUTO: 101.9 FL — HIGH (ref 80–100)
MONOCYTES # BLD AUTO: 1.06 K/UL — HIGH (ref 0–0.9)
MONOCYTES NFR BLD AUTO: 8.4 % — SIGNIFICANT CHANGE UP (ref 2–14)
NEUTROPHILS # BLD AUTO: 10.59 K/UL — HIGH (ref 1.8–7.4)
NEUTROPHILS NFR BLD AUTO: 83.5 % — HIGH (ref 43–77)
NITRITE UR-MCNC: NEGATIVE — SIGNIFICANT CHANGE UP
NRBC # BLD: 0 /100 WBCS — SIGNIFICANT CHANGE UP (ref 0–0)
NT-PROBNP SERPL-SCNC: 612 PG/ML — HIGH (ref 0–450)
PH UR: 6 — SIGNIFICANT CHANGE UP (ref 5–8)
PLATELET # BLD AUTO: 200 K/UL — SIGNIFICANT CHANGE UP (ref 150–400)
POTASSIUM SERPL-MCNC: 4 MMOL/L — SIGNIFICANT CHANGE UP (ref 3.5–5.3)
POTASSIUM SERPL-SCNC: 4 MMOL/L — SIGNIFICANT CHANGE UP (ref 3.5–5.3)
PROT SERPL-MCNC: 7.1 G/DL — SIGNIFICANT CHANGE UP (ref 6–8.3)
PROT UR-MCNC: NEGATIVE MG/DL — SIGNIFICANT CHANGE UP
PROTHROM AB SERPL-ACNC: 56.6 SEC — HIGH (ref 9.5–13)
RAPID RVP RESULT: SIGNIFICANT CHANGE UP
RBC # BLD: 3.73 M/UL — LOW (ref 3.8–5.2)
RBC # FLD: 13.3 % — SIGNIFICANT CHANGE UP (ref 10.3–14.5)
SARS-COV-2 RNA SPEC QL NAA+PROBE: SIGNIFICANT CHANGE UP
SODIUM SERPL-SCNC: 135 MMOL/L — SIGNIFICANT CHANGE UP (ref 135–145)
SP GR SPEC: 1.01 — SIGNIFICANT CHANGE UP (ref 1–1.03)
TROPONIN I, HIGH SENSITIVITY RESULT: 17.9 NG/L — SIGNIFICANT CHANGE UP
UROBILINOGEN FLD QL: 0.2 MG/DL — SIGNIFICANT CHANGE UP (ref 0.2–1)
WBC # BLD: 12.68 K/UL — HIGH (ref 3.8–10.5)
WBC # FLD AUTO: 12.68 K/UL — HIGH (ref 3.8–10.5)

## 2024-02-21 PROCEDURE — 99285 EMERGENCY DEPT VISIT HI MDM: CPT

## 2024-02-21 PROCEDURE — 71045 X-RAY EXAM CHEST 1 VIEW: CPT | Mod: 26

## 2024-02-21 PROCEDURE — 99223 1ST HOSP IP/OBS HIGH 75: CPT

## 2024-02-21 RX ORDER — ACETAMINOPHEN 500 MG
650 TABLET ORAL EVERY 6 HOURS
Refills: 0 | Status: DISCONTINUED | OUTPATIENT
Start: 2024-02-21 | End: 2024-02-27

## 2024-02-21 RX ORDER — ALPRAZOLAM 0.25 MG
0.5 TABLET ORAL AT BEDTIME
Refills: 0 | Status: DISCONTINUED | OUTPATIENT
Start: 2024-02-21 | End: 2024-02-27

## 2024-02-21 RX ORDER — ONDANSETRON 8 MG/1
4 TABLET, FILM COATED ORAL ONCE
Refills: 0 | Status: COMPLETED | OUTPATIENT
Start: 2024-02-21 | End: 2024-02-21

## 2024-02-21 RX ORDER — LANOLIN ALCOHOL/MO/W.PET/CERES
3 CREAM (GRAM) TOPICAL AT BEDTIME
Refills: 0 | Status: DISCONTINUED | OUTPATIENT
Start: 2024-02-21 | End: 2024-02-22

## 2024-02-21 RX ORDER — SODIUM CHLORIDE 9 MG/ML
1000 INJECTION INTRAMUSCULAR; INTRAVENOUS; SUBCUTANEOUS ONCE
Refills: 0 | Status: COMPLETED | OUTPATIENT
Start: 2024-02-21 | End: 2024-02-21

## 2024-02-21 RX ORDER — ONDANSETRON 8 MG/1
4 TABLET, FILM COATED ORAL EVERY 8 HOURS
Refills: 0 | Status: DISCONTINUED | OUTPATIENT
Start: 2024-02-21 | End: 2024-02-27

## 2024-02-21 RX ORDER — AZITHROMYCIN 500 MG/1
500 TABLET, FILM COATED ORAL EVERY 24 HOURS
Refills: 0 | Status: COMPLETED | OUTPATIENT
Start: 2024-02-22 | End: 2024-02-24

## 2024-02-21 RX ORDER — ESCITALOPRAM OXALATE 10 MG/1
10 TABLET, FILM COATED ORAL DAILY
Refills: 0 | Status: DISCONTINUED | OUTPATIENT
Start: 2024-02-21 | End: 2024-02-27

## 2024-02-21 RX ORDER — FLUTICASONE PROPIONATE 50 MCG
1 SPRAY, SUSPENSION NASAL
Refills: 0 | Status: DISCONTINUED | OUTPATIENT
Start: 2024-02-21 | End: 2024-02-27

## 2024-02-21 RX ORDER — CEFTRIAXONE 500 MG/1
1000 INJECTION, POWDER, FOR SOLUTION INTRAMUSCULAR; INTRAVENOUS EVERY 24 HOURS
Refills: 0 | Status: COMPLETED | OUTPATIENT
Start: 2024-02-22 | End: 2024-02-26

## 2024-02-21 RX ORDER — ACETAMINOPHEN 500 MG
650 TABLET ORAL ONCE
Refills: 0 | Status: COMPLETED | OUTPATIENT
Start: 2024-02-21 | End: 2024-02-21

## 2024-02-21 RX ORDER — CEFEPIME 1 G/1
2000 INJECTION, POWDER, FOR SOLUTION INTRAMUSCULAR; INTRAVENOUS ONCE
Refills: 0 | Status: COMPLETED | OUTPATIENT
Start: 2024-02-21 | End: 2024-02-21

## 2024-02-21 RX ORDER — LEVOTHYROXINE SODIUM 125 MCG
75 TABLET ORAL DAILY
Refills: 0 | Status: DISCONTINUED | OUTPATIENT
Start: 2024-02-21 | End: 2024-02-27

## 2024-02-21 RX ORDER — ATORVASTATIN CALCIUM 80 MG/1
40 TABLET, FILM COATED ORAL AT BEDTIME
Refills: 0 | Status: DISCONTINUED | OUTPATIENT
Start: 2024-02-21 | End: 2024-02-27

## 2024-02-21 RX ORDER — FUROSEMIDE 40 MG
40 TABLET ORAL DAILY
Refills: 0 | Status: DISCONTINUED | OUTPATIENT
Start: 2024-02-21 | End: 2024-02-27

## 2024-02-21 RX ORDER — GUAIFENESIN/DEXTROMETHORPHAN 600MG-30MG
10 TABLET, EXTENDED RELEASE 12 HR ORAL EVERY 4 HOURS
Refills: 0 | Status: DISCONTINUED | OUTPATIENT
Start: 2024-02-21 | End: 2024-02-23

## 2024-02-21 RX ORDER — VANCOMYCIN HCL 1 G
1000 VIAL (EA) INTRAVENOUS ONCE
Refills: 0 | Status: COMPLETED | OUTPATIENT
Start: 2024-02-21 | End: 2024-02-21

## 2024-02-21 RX ADMIN — CEFEPIME 100 MILLIGRAM(S): 1 INJECTION, POWDER, FOR SOLUTION INTRAMUSCULAR; INTRAVENOUS at 17:37

## 2024-02-21 RX ADMIN — Medication 250 MILLIGRAM(S): at 18:01

## 2024-02-21 RX ADMIN — SODIUM CHLORIDE 1000 MILLILITER(S): 9 INJECTION INTRAMUSCULAR; INTRAVENOUS; SUBCUTANEOUS at 17:37

## 2024-02-21 RX ADMIN — Medication 650 MILLIGRAM(S): at 21:37

## 2024-02-21 RX ADMIN — CEFEPIME 2000 MILLIGRAM(S): 1 INJECTION, POWDER, FOR SOLUTION INTRAMUSCULAR; INTRAVENOUS at 21:38

## 2024-02-21 RX ADMIN — Medication 1000 MILLIGRAM(S): at 21:38

## 2024-02-21 RX ADMIN — Medication 650 MILLIGRAM(S): at 17:37

## 2024-02-21 RX ADMIN — SODIUM CHLORIDE 1000 MILLILITER(S): 9 INJECTION INTRAMUSCULAR; INTRAVENOUS; SUBCUTANEOUS at 21:38

## 2024-02-21 RX ADMIN — ONDANSETRON 4 MILLIGRAM(S): 8 TABLET, FILM COATED ORAL at 17:37

## 2024-02-21 RX ADMIN — Medication 100 MILLIGRAM(S): at 17:37

## 2024-02-21 NOTE — ED PROVIDER NOTE - PHYSICAL EXAMINATION
PHYSICAL EXAM:  GENERAL: Well-groomed, well-developed, NAD  HEAD:  Atraumatic, Normocephalic  EYES: EOMI, PERRLA, conjunctiva and sclera clear  ENMT: No tonsillar erythema, exudates, or enlargement; Dry mucous membranes  NECK: Supple, No JVD, Normal thyroid  HEART: Regular rate and rhythm; No murmurs, rubs, or gallops  RESPIRATORY: CTA b/l  ABDOMEN: Soft, Nontender, Nondistended; Bowel sounds present  NEUROLOGY: A&Ox3, nonfocal, moving all extremities  EXTREMITIES:  2+ Peripheral Pulses, 2+ peripheral edema  SKIN: warm, dry PHYSICAL EXAM:  GENERAL: Well-groomed, well-developed, NAD  HEAD:  Atraumatic, Normocephalic  EYES: EOMI, PERRLA, conjunctiva and sclera clear  ENMT: No tonsillar erythema, exudates, or enlargement; Dry mucous membranes  NECK: Supple, No JVD, Normal thyroid  HEART: Regular rate and rhythm; No murmurs, rubs, or gallops  RESPIRATORY: CTA b/l on 2 L NC  ABDOMEN: Soft, Nontender, Nondistended; Bowel sounds present  NEUROLOGY: A&Ox3, nonfocal, moving all extremities  EXTREMITIES:  2+ Peripheral Pulses, 2+ peripheral edema  SKIN: warm, dry

## 2024-02-21 NOTE — H&P ADULT - HISTORY OF PRESENT ILLNESS
Patient is a 87 y/o F who lives alone and walks with a walker w/ PMH of asthma, b/l DVT (20 years ago), hypothyroidism, who presented with 1 week history of worsening cough and SOB. Patient reports that she started developed a cough w/ sputum 7 days ago which has since worsened. Her cough is associated with worsening SOB, subjective fevers, chills, severe fatigue, body aches and weakness. Patient reports she is having difficulty ambulation because of weakness. Patient denies any recent headache, dizziness, lightheadedness, chest pain, palpitations, nausea, vomiting, abdominal pain, diarrhea, constipation, melena, hematochezia, dysuria, urinary frequency, or urgency. Patient denies any other complains at this time.

## 2024-02-21 NOTE — H&P ADULT - ASSESSMENT
Patient is a 87 y/o F who lives alone and walks with a walker w/ PMH of asthma, b/l DVT (20 years ago), hypothyroidism, who presented with 1 week history of worsening cough and SOB associated with fevers, and chills. CXR is suspicious for RLL consolidation. Patient is being admitted for acute hypoxic respiratory failure and sepsis 2/2 to pneumonia.  Patient is a 89 y/o F who lives alone and walks with a walker w/ PMH of asthma, b/l DVT (20 years ago), hypothyroidism, who presented with 1 week history of worsening cough and SOB associated with fevers, and chills. CXR is suspicious for RLL consolidation. Patient is being admitted for acute hypoxic respiratory failure and sepsis 2/2 to pneumonia.     Patient does not remember medications/does not have a medication list. Pharmacy is closed at this time. Primary team to confirm medication reconciliation.

## 2024-02-21 NOTE — H&P ADULT - PROBLEM SELECTOR PLAN 3
- On warfarin for DVT.   - P/w INR of 5.21.   - No signs of active bleed.   - Hold warfarin dose.   - F/U PT/INR in AM

## 2024-02-21 NOTE — ED PROVIDER NOTE - OBJECTIVE STATEMENT
87 y/o F with PMHx of Asthma and DVT on warfarin p/w 3 months of cough and 4 days of lightheaded c/b fever x 1 day. Until cough 3 months ago pt otherwise in usual state of health. Cough was non-productive and occurred throughout the day. Pt recently visited PCP and said s/p nebulizer and nasal spray the cough was well controlled. Pt normally walks with a walker at home, is not on home O2 and is able to walk a few blocks without any SOB. Over the past 4 days she has been feeling increased fatigue, nausea and lightheadedness. Yesterday she developed a fever, had diarrhea x1 and was feeling very lightheaded prompting her to come to the ED today. Pt denies CP, increased SOB, urinary sx, H/A, constipation. Pt has never smoked, no unintentional weight loss, no night sweats and has no sick contacts. 89 y/o F with PMHx of Asthma and DVT on warfarin p/w 3 months of dry cough throughout the day and 4 days of lightheaded c/b fever x 2 day. recently visited PCP and said s/p nebulizer and nasal spray the cough was well controlled. Pt normally walks with a walker at home, is not on home O2 and is able to walk a few blocks without any SOB. Over the past 4 days she has been feeling increased fatigue, increase sob, nausea and lightheadedness. Yesterday she developed a fever, had diarrhea x1 and was feeling very lightheaded prompting her to come to the ED today. Pt denies CP, urinary sx, H/A, constipation, weigh loss, night sweats. no smoking or drugs.

## 2024-02-21 NOTE — ED PROVIDER NOTE - CLINICAL SUMMARY MEDICAL DECISION MAKING FREE TEXT BOX
89 y/o with PMH of Asthma and DVT p/w 3 months of dry cough and 4 days of lightheadedness c/b 1 day of fever (tmax at home: 100.5) . Pt having increased fatigue from baseline and has also had 1 episode of diarrhea. On exam, when taken off  4L NC, pt desatted to 91%. Labs: elevated white count of 12.68, negative D-dimer and negative lactate. EKG showed sinus tachycardia with PVCs. Pending Blood Cx, U/A, RVP, Troponins, CXR. 89 y/o with PMH of Asthma and DVT p/w 3 months of dry cough and 4 days of lightheadedness c/b 1 day of fever (tmax at home: 100.5) . Pt having increased fatigue from baseline and has also had 1 episode of diarrhea. On exam, when taken off  4L NC, pt desatted to 91%. Labs: elevated white count of 12.68, negative D-dimer, negative troponin and negative lactate. EKG showed sinus tachycardia with PVCs. Pending Blood Cx, U/A, RVP, CXR. 89 y/o with PMH of Asthma and DVT p/w 3 months of dry cough and 4 days of lightheadedness c/b 1 day of fever (tmax at home: 100.5) . Pt having increased fatigue from baseline and has also had 1 episode of diarrhea. On exam, when taken off  4L NC, pt desatted to 91%.     possibly viral uri vs bronchitis r/o pna vs uti vs malignancy? vs pe vs vegetation- sepsis work up, maintain on 2 L NC, abx, admit.    Labs: elevated white count of 12.68, negative D-dimer, negative troponin and negative lactate. EKG showed sinus tachycardia with PVCs. Pending Blood Cx, U/A, RVP, CXR.

## 2024-02-21 NOTE — ED PROVIDER NOTE - PROGRESS NOTE DETAILS
Rodrigues: pt feels better after treatment. cxr no consolidation on my read. ua and rvp pending. still require 2-3 L NC. admit

## 2024-02-21 NOTE — H&P ADULT - PROBLEM SELECTOR PLAN 1
- P/w cough, SOB, fevers, chills.   - Met sepsis criteria (leukocytosis, febrile. No tachypnea and tachycardia)  - Hypoxic on RA. Requiring 2L NC.   - RVP negative.   - CXR -  suspicious for RLL consolidation (await official read)  - S/p cefepime and vanc in ED.   - S/p 1L NS in ED.   - C/w ceftriaxone and azithromycin.   - F/U Bcx.   - F/U sputum cx.   - F/U mycoplasma, strep, legionella, MRSA.

## 2024-02-21 NOTE — ED PROVIDER NOTE - NS ED ROS FT
REVIEW OF SYSTEMS:  CONSTITUTIONAL: (-) weakness, (+) fevers (-) chills  EYES/ENT: (- ) visual changes;  (-) vertigo (-) throat pain   NECK: (-) pain (-) stiffness  RESPIRATORY: (-) cough,  (+) wheezing, (-) hemoptysis; (-) shortness of breath  CARDIOVASCULAR: (-) chest pain (-) palpitations  GASTROINTESTINAL: (-) abdominal (-) epigastric pain. (+) nausea, (-) vomiting, or hematemesis; (+) diarrhea, (-) constipation.   GENITOURINARY: (-) dysuria, frequency or hematuria  NEUROLOGICAL: (-) numbness or weakness  SKIN: (-) itching, rashes REVIEW OF SYSTEMS:  CONSTITUTIONAL: (-) weakness, (+) fevers (-) chills  EYES/ENT: (- ) visual changes;  (-) vertigo (-) throat pain   NECK: (-) pain (-) stiffness  RESPIRATORY: (-) cough,  (+) wheezing, (-) hemoptysis; (+) shortness of breath  CARDIOVASCULAR: (-) chest pain (-) palpitations  GASTROINTESTINAL: (-) abdominal (-) epigastric pain. (+) nausea, (-) vomiting, or hematemesis; (+) diarrhea, (-) constipation.   GENITOURINARY: (-) dysuria, frequency or hematuria  NEUROLOGICAL: (-) numbness or weakness  SKIN: (-) itching, rashes

## 2024-02-21 NOTE — ED ADULT NURSE NOTE - OBJECTIVE STATEMENT
pt presents SOB x 4 days with fever 2 days. Patient endorsing hx asthma. At baseline pt is AAox4, speaking in clear and complete sentences.

## 2024-02-21 NOTE — ED PROVIDER NOTE - ATTENDING CONTRIBUTION TO CARE
I was physically present for the E/M service provided. I agree with above history, physical, and plan which I have reviewed and edited where appropriate. I was physically present for the key portions of the service provided.    Rodrigues: changes made by me - brief- sob, fever, cough febrile in ed. on coumadin no other sx. but require 2 L NC and pt not on O2. sepsis work up and admit

## 2024-02-21 NOTE — H&P ADULT - NSICDXPASTMEDICALHX_GEN_ALL_CORE_FT
PAST MEDICAL HISTORY:  Asthma     Deep vein thrombosis (DVT)     Hyperlipidemia     Hypothyroidism

## 2024-02-21 NOTE — H&P ADULT - ATTENDING COMMENTS
87yo 77kg f w pmh htn, hld, asthma, hypothyroidism, gerd, constipation, anx/dep, oa s/p bl thr, dvt, p/w cough, sob/ruelas, fever; in er, found to be meeting sepsis criteria and in ahrf initially req supplementary o2 of 4 lpm via nc; suspecting 2/2 pna; admit to medicine for further mgmt.    Sepsis  leukocytosis + tachycardia + fever; meets sepsis criteria  otherwise, hds  source; based on work up thus far, suspecting pna  s/p 1 L LR + vanc and cefepime in ER  follow up blood cultures  Monitor for fever, changes in white count  broad spec empirical abx therapy as described below  ivf resusci + lytes as needed.    Ahrf  h/o asthma (w recent exacerbation in 12/2023, managed w po steroid taper)  Suspect 2/2 pna  Currently In no respiratory distress with adequate spo2 on 4=>2 LPM via NC  cxr pending read  rvp, including covid, negative  D-dimer wnl, trop + bnp wnl  follow up abg/vbg; procal, sputum cultures, atypical pna urine ag, mrsa screen  consider ct chest if no clinical improvement  Monitor SpO2, RR, for signs of respiratory distress; Goal SpO2 >88-92%,   bronchodilators + oxygen supplementation as needed to maintain goal  empirical broad spec abx with ceftriaxone and azithromycin, deescalate according to infectious work up  symptomatic relief with antitussives, mucolytics, antipyretics  aggressive pulmonary toilet/hygiene with incentive spirometry    Otherwise, concur w a+p as described by resident above

## 2024-02-21 NOTE — ED ADULT NURSE NOTE - NSFALLUNIVINTERV_ED_ALL_ED
Bed/Stretcher in lowest position, wheels locked, appropriate side rails in place/Call bell, personal items and telephone in reach/Instruct patient to call for assistance before getting out of bed/chair/stretcher/Non-slip footwear applied when patient is off stretcher/[place] to call system/Physically safe environment - no spills, clutter or unnecessary equipment/Purposeful proactive rounding/Room/bathroom lighting operational, light cord in reach

## 2024-02-22 DIAGNOSIS — E03.9 HYPOTHYROIDISM, UNSPECIFIED: ICD-10-CM

## 2024-02-22 DIAGNOSIS — R79.1 ABNORMAL COAGULATION PROFILE: ICD-10-CM

## 2024-02-22 DIAGNOSIS — A41.9 SEPSIS, UNSPECIFIED ORGANISM: ICD-10-CM

## 2024-02-22 DIAGNOSIS — J18.9 PNEUMONIA, UNSPECIFIED ORGANISM: ICD-10-CM

## 2024-02-22 DIAGNOSIS — Z29.9 ENCOUNTER FOR PROPHYLACTIC MEASURES, UNSPECIFIED: ICD-10-CM

## 2024-02-22 DIAGNOSIS — I82.409 ACUTE EMBOLISM AND THROMBOSIS OF UNSPECIFIED DEEP VEINS OF UNSPECIFIED LOWER EXTREMITY: ICD-10-CM

## 2024-02-22 DIAGNOSIS — Z02.9 ENCOUNTER FOR ADMINISTRATIVE EXAMINATIONS, UNSPECIFIED: ICD-10-CM

## 2024-02-22 LAB
ANION GAP SERPL CALC-SCNC: 2 MMOL/L — LOW (ref 5–17)
BUN SERPL-MCNC: 13 MG/DL — SIGNIFICANT CHANGE UP (ref 7–18)
CALCIUM SERPL-MCNC: 8.9 MG/DL — SIGNIFICANT CHANGE UP (ref 8.4–10.5)
CHLORIDE SERPL-SCNC: 105 MMOL/L — SIGNIFICANT CHANGE UP (ref 96–108)
CO2 SERPL-SCNC: 30 MMOL/L — SIGNIFICANT CHANGE UP (ref 22–31)
CREAT SERPL-MCNC: 0.88 MG/DL — SIGNIFICANT CHANGE UP (ref 0.5–1.3)
CULTURE RESULTS: SIGNIFICANT CHANGE UP
EGFR: 64 ML/MIN/1.73M2 — SIGNIFICANT CHANGE UP
FERRITIN SERPL-MCNC: 287 NG/ML — SIGNIFICANT CHANGE UP (ref 13–330)
FOLATE SERPL-MCNC: >20 NG/ML — SIGNIFICANT CHANGE UP
GLUCOSE SERPL-MCNC: 167 MG/DL — HIGH (ref 70–99)
HAPTOGLOB SERPL-MCNC: 337 MG/DL — HIGH (ref 34–200)
HCT VFR BLD CALC: 35.6 % — SIGNIFICANT CHANGE UP (ref 34.5–45)
HGB BLD-MCNC: 11.6 G/DL — SIGNIFICANT CHANGE UP (ref 11.5–15.5)
INR BLD: 4.87 RATIO — HIGH (ref 0.85–1.18)
IRON SATN MFR SERPL: 18 UG/DL — LOW (ref 40–150)
IRON SATN MFR SERPL: 8 % — LOW (ref 15–50)
LDH SERPL L TO P-CCNC: 174 U/L — SIGNIFICANT CHANGE UP (ref 120–225)
LEGIONELLA AG UR QL: NEGATIVE — SIGNIFICANT CHANGE UP
MAGNESIUM SERPL-MCNC: 2.1 MG/DL — SIGNIFICANT CHANGE UP (ref 1.6–2.6)
MCHC RBC-ENTMCNC: 32.6 GM/DL — SIGNIFICANT CHANGE UP (ref 32–36)
MCHC RBC-ENTMCNC: 33.4 PG — SIGNIFICANT CHANGE UP (ref 27–34)
MCV RBC AUTO: 102.6 FL — HIGH (ref 80–100)
MRSA PCR RESULT.: SIGNIFICANT CHANGE UP
NRBC # BLD: 0 /100 WBCS — SIGNIFICANT CHANGE UP (ref 0–0)
PHOSPHATE SERPL-MCNC: 2.2 MG/DL — LOW (ref 2.5–4.5)
PLATELET # BLD AUTO: 191 K/UL — SIGNIFICANT CHANGE UP (ref 150–400)
POTASSIUM SERPL-MCNC: 3.8 MMOL/L — SIGNIFICANT CHANGE UP (ref 3.5–5.3)
POTASSIUM SERPL-SCNC: 3.8 MMOL/L — SIGNIFICANT CHANGE UP (ref 3.5–5.3)
PROCALCITONIN SERPL-MCNC: 0.21 NG/ML — HIGH (ref 0.02–0.1)
PROTHROM AB SERPL-ACNC: 53 SEC — HIGH (ref 9.5–13)
RBC # BLD: 3.47 M/UL — LOW (ref 3.8–5.2)
RBC # BLD: 3.47 M/UL — LOW (ref 3.8–5.2)
RBC # FLD: 13.4 % — SIGNIFICANT CHANGE UP (ref 10.3–14.5)
RETICS #: 36.8 K/UL — SIGNIFICANT CHANGE UP (ref 25–125)
RETICS/RBC NFR: 1.1 % — SIGNIFICANT CHANGE UP (ref 0.5–2.5)
S AUREUS DNA NOSE QL NAA+PROBE: SIGNIFICANT CHANGE UP
S PNEUM AG UR QL: NEGATIVE — SIGNIFICANT CHANGE UP
SODIUM SERPL-SCNC: 137 MMOL/L — SIGNIFICANT CHANGE UP (ref 135–145)
SPECIMEN SOURCE: SIGNIFICANT CHANGE UP
TIBC SERPL-MCNC: 214 UG/DL — LOW (ref 250–450)
TSH SERPL-MCNC: 0.6 UU/ML — SIGNIFICANT CHANGE UP (ref 0.34–4.82)
UIBC SERPL-MCNC: 196 UG/DL — SIGNIFICANT CHANGE UP (ref 110–370)
VIT B12 SERPL-MCNC: 553 PG/ML — SIGNIFICANT CHANGE UP (ref 232–1245)
WBC # BLD: 11.84 K/UL — HIGH (ref 3.8–10.5)
WBC # FLD AUTO: 11.84 K/UL — HIGH (ref 3.8–10.5)

## 2024-02-22 PROCEDURE — 99233 SBSQ HOSP IP/OBS HIGH 50: CPT | Mod: FS

## 2024-02-22 RX ADMIN — ATORVASTATIN CALCIUM 40 MILLIGRAM(S): 80 TABLET, FILM COATED ORAL at 21:45

## 2024-02-22 RX ADMIN — Medication 650 MILLIGRAM(S): at 17:17

## 2024-02-22 RX ADMIN — CEFTRIAXONE 100 MILLIGRAM(S): 500 INJECTION, POWDER, FOR SOLUTION INTRAMUSCULAR; INTRAVENOUS at 05:41

## 2024-02-22 RX ADMIN — ESCITALOPRAM OXALATE 10 MILLIGRAM(S): 10 TABLET, FILM COATED ORAL at 11:27

## 2024-02-22 RX ADMIN — Medication 75 MICROGRAM(S): at 06:09

## 2024-02-22 RX ADMIN — Medication 10 MILLILITER(S): at 11:27

## 2024-02-22 RX ADMIN — Medication 40 MILLIGRAM(S): at 06:09

## 2024-02-22 RX ADMIN — AZITHROMYCIN 255 MILLIGRAM(S): 500 TABLET, FILM COATED ORAL at 05:24

## 2024-02-22 RX ADMIN — Medication 0.5 MILLIGRAM(S): at 03:52

## 2024-02-22 RX ADMIN — Medication 1 SPRAY(S): at 17:17

## 2024-02-22 RX ADMIN — Medication 1 SPRAY(S): at 05:37

## 2024-02-22 NOTE — PROGRESS NOTE ADULT - PROBLEM SELECTOR PLAN 3
- On warfarin for DVT.   - P/w INR of 5.21.   - No signs of active bleed.   - Hold warfarin dose.   - F/U INR 4.87 - continue to hold warfarin now and daily PT/INT monitoring

## 2024-02-22 NOTE — PROGRESS NOTE ADULT - ASSESSMENT
Patient is a 89 y/o F who lives alone and walks with a walker w/ PMH of asthma, b/l DVT (20 years ago), hypothyroidism, who presented with 1 week history of worsening cough and SOB associated with fevers, and chills. CXR is suspicious for RLL consolidation. Patient is being admitted for acute hypoxic respiratory failure and sepsis 2/2 to pneumonia.   seen and examined pt at the bedside Pt is AO x 3 eating regular consistency food well without swallowing difficulty saturating well on O2 2:.min via N-C. Tolerating antibiotics well,  cultures being tested. otherwise, pt is not in distress

## 2024-02-22 NOTE — PROGRESS NOTE ADULT - NS ATTEST RISK PROBLEM GEN_ALL_CORE FT
Sepsis, AHRF- high risk condition   reviewed- cbc, bmp, INR, order cbc, bmp, sputum cx   prior records reviewed in HIE   independently reviewed chest x-ray

## 2024-02-22 NOTE — PHARMACOTHERAPY INTERVENTION NOTE - COMMENTS
Patient’s pharmacies were contacted and the outpatient medication review was updated based on their prescription histories.    [facility] at [phone]: has prescriptions for alprazolam and escitalopram  [place] [id number] at [phone]: has prescriptions for furosemide, levothyroxine, rosuvastatin, and warfarin  
PRN insomnia
Patient identified by [facility] for Pneumonia. Medication list was reviewed and no additional interventions at this time.

## 2024-02-22 NOTE — PROGRESS NOTE ADULT - PROBLEM SELECTOR PLAN 7
pending blood and urine cultures  PT eval - f/u   titrate down O2 as tolerated  spoke to granddaughter via phone and all questions answered

## 2024-02-22 NOTE — PROGRESS NOTE ADULT - NS ATTEND AMEND GEN_ALL_CORE FT
Patient is a 89 y/o F who lives alone and walks with a walker w/ PMH of asthma, b/l DVT (20 years ago), hypothyroidism, who presented with 1 week history of worsening cough and SOB associated with fevers, and chills. CXR is suspicious for RLL consolidation. Patient is being admitted for acute hypoxic respiratory failure and sepsis 2/2 to pneumonia.    Patient was seen and examined, reports having cough and fever intermittently. Does not feel short of breath     Labs reviewed- cbc, bmp, INR    PE as above   b/l expiratory wheezing     chest x-ray reviewed- bi-basilar opacities     A/P:  #Acute hypoxic respiratory failure- 2L NC   #Sepsis   #Community acquired pneumonia - multi-focal   #Asthma  #H/o b/l DVT  #Supra- therapeutic inr   #Hypothyroidism     Plan:  -C/w IV abx, follow sputum cx, blood cx, legionella, mycoplasma   -c/w duonebs   -c/w supplemental O2   -INR elevated, hold coumadin. No evidence of bleeding at this time.  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  Will attempt to reach out to PCP.   -c/w levothyroxine

## 2024-02-22 NOTE — PROGRESS NOTE ADULT - SUBJECTIVE AND OBJECTIVE BOX
NP Note discussed with  Primary Attending    Patient is a 88y old  Female who presents with a chief complaint of     INTERVAL HPI/OVERNIGHT EVENTS: no new complaints    MEDICATIONS  (STANDING):  atorvastatin 40 milliGRAM(s) Oral at bedtime  azithromycin  IVPB 500 milliGRAM(s) IV Intermittent every 24 hours  cefTRIAXone   IVPB 1000 milliGRAM(s) IV Intermittent every 24 hours  escitalopram 10 milliGRAM(s) Oral daily  fluticasone propionate 50 MICROgram(s)/spray Nasal Spray 1 Spray(s) Both Nostrils two times a day  furosemide    Tablet 40 milliGRAM(s) Oral daily  levothyroxine 75 MICROGram(s) Oral daily    MEDICATIONS  (PRN):  acetaminophen     Tablet .. 650 milliGRAM(s) Oral every 6 hours PRN Temp greater or equal to 38C (100.4F), Mild Pain (1 - 3)  ALPRAZolam 0.5 milliGRAM(s) Oral at bedtime PRN insomnia/anxiety  guaifenesin/dextromethorphan Oral Liquid 10 milliLiter(s) Oral every 4 hours PRN Cough  ondansetron Injectable 4 milliGRAM(s) IV Push every 8 hours PRN Nausea and/or Vomiting      __________________________________________________  REVIEW OF SYSTEMS:    CONSTITUTIONAL: No fever,   EYES: no acute visual disturbances  NECK: No pain or stiffness  RESPIRATORY: No cough; No shortness of breath  CARDIOVASCULAR: No chest pain, no palpitations  GASTROINTESTINAL: No pain. No nausea or vomiting; No diarrhea   NEUROLOGICAL: No headache or numbness, no tremors  MUSCULOSKELETAL: No joint pain, no muscle pain  GENITOURINARY: no dysuria, no frequency, no hesitancy  PSYCHIATRY: no depression , no anxiety  ALL OTHER  ROS negative        Vital Signs Last 24 Hrs  T(C): 37.3 (22 Feb 2024 11:12), Max: 38.7 (21 Feb 2024 17:15)  T(F): 99.1 (22 Feb 2024 11:12), Max: 101.6 (21 Feb 2024 17:15)  HR: 97 (22 Feb 2024 09:42) (80 - 97)  BP: 127/74 (22 Feb 2024 11:12) (106/66 - 171/81)  BP(mean): 111 (22 Feb 2024 07:40) (111 - 111)  RR: 18 (22 Feb 2024 11:12) (18 - 20)  SpO2: 97% (22 Feb 2024 11:12) (95% - 99%)    Parameters below as of 22 Feb 2024 11:12  Patient On (Oxygen Delivery Method): nasal cannula  O2 Flow (L/min): 2      ________________________________________________  PHYSICAL EXAM:  GENERAL: NAD  HEENT: Normocephalic;  conjunctivae and sclerae clear; moist mucous membranes;   NECK : supple  CHEST/LUNG: Clear to auscultation bilaterally with good air entry   HEART: S1 S2  regular; no murmurs, gallops or rubs  ABDOMEN: Soft, Nontender, Nondistended; Bowel sounds present  EXTREMITIES: no cyanosis; no edema; no calf tenderness  SKIN: warm and dry; no rash  NERVOUS SYSTEM:  Awake and alert; Oriented  to place, person and time ; no new deficits    _________________________________________________  LABS:                        11.6   11.84 )-----------( 191      ( 22 Feb 2024 06:52 )             35.6     02-22    137  |  105  |  13  ----------------------------<  167<H>  3.8   |  30  |  0.88    Ca    8.9      22 Feb 2024 06:52  Phos  2.2     02-22  Mg     2.1     02-22    TPro  7.1  /  Alb  2.9<L>  /  TBili  0.7  /  DBili  x   /  AST  58<H>  /  ALT  22  /  AlkPhos  75  02-21    PT/INR - ( 22 Feb 2024 06:52 )   PT: 53.0 sec;   INR: 4.87 ratio         PTT - ( 21 Feb 2024 16:50 )  PTT:40.7 sec  Urinalysis Basic - ( 22 Feb 2024 06:52 )    Color: x / Appearance: x / SG: x / pH: x  Gluc: 167 mg/dL / Ketone: x  / Bili: x / Urobili: x   Blood: x / Protein: x / Nitrite: x   Leuk Esterase: x / RBC: x / WBC x   Sq Epi: x / Non Sq Epi: x / Bacteria: x      CAPILLARY BLOOD GLUCOSE      POCT Blood Glucose.: 138 mg/dL (21 Feb 2024 16:57)        RADIOLOGY & ADDITIONAL TESTS:  < from: Xray Chest 1 View- PORTABLE-Urgent (02.21.24 @ 18:36) >  ACC: [id number] EXAM:  XR CHEST PORTABLE URGENT 1V   ORDERED BY: PASCUAL SCHMITZ     PROCEDURE DATE:  02/21/2024          INTERPRETATION:  TIME OF EXAM: February 21, 2024 at 5:41 PM.    CLINICAL INFORMATION: Sepsis.    COMPARISON:  None available    TECHNIQUE:   AP Portable chest x-ray. Metallic artifacts from a bra   project over the image.    INTERPRETATION:    Heart size and the mediastinum cannot be accurately evaluated on this   projection.  Calcified thoracic aorta.  There are low lung volumes.  There are bibasilar opacities.  A skin fold projects over the left chest.  No pleural effusion or pneumothorax is seen.  There is scoliosis of the spine.        IMPRESSION:  Low lung volumes.    Bibasilar opacities which could be due to subsegmental atelectasis or   multifocal infection.    --- End of Report ---            MIRYAM GOLDEN MD; Attending Radiologist  This document has been electronically signed. Feb 22 2024 12:43PM    < end of copied text >    Imaging Personally Reviewed:  YES    Consultant(s) Notes Reviewed:   No    Care Discussed with Consultants :     Plan of care was discussed with patient and /or primary care giver; all questions and concerns were addressed and care was aligned with patient's wishes.

## 2024-02-22 NOTE — PROGRESS NOTE ADULT - PROBLEM SELECTOR PLAN 1
- P/w cough, SOB, fevers, chills.   - Met sepsis criteria (leukocytosis, febrile. No tachypnea and tachycardia)  - Hypoxic on RA. Requiring 2L NC.   - RVP negative.   - CXR -  as above   - S/p cefepime and vanc in ED.   - S/p 1L NS in ED.   - C/w ceftriaxone and azithromycin.   - F/U Bcx and urine culture ( UA was negative)   - F/U sputum cx.   - F/U mycoplasma, strep, legionella, MRSA.

## 2024-02-22 NOTE — PATIENT PROFILE ADULT - FALL HARM RISK - HARM RISK INTERVENTIONS
Assistance with ambulation/Assistance OOB with selected safe patient handling equipment/Communicate Risk of Fall with Harm to all staff/Discuss with provider need for PT consult/Monitor gait and stability/Provide patient with walking aids - walker, cane, crutches/Reinforce activity limits and safety measures with patient and family/Sit up slowly, dangle for a short time, stand at bedside before walking/Tailored Fall Risk Interventions/Visual Cue: Yellow wristband and red socks/Bed in lowest position, wheels locked, appropriate side rails in place/Call bell, personal items and telephone in reach/Instruct patient to call for assistance before getting out of bed or chair/Non-slip footwear when patient is out of bed/[place] to call system/Physically safe environment - no spills, clutter or unnecessary equipment/Purposeful Proactive Rounding/Room/bathroom lighting operational, light cord in reach

## 2024-02-23 ENCOUNTER — TRANSCRIPTION ENCOUNTER (OUTPATIENT)
Age: 89
End: 2024-02-23

## 2024-02-23 LAB
ANION GAP SERPL CALC-SCNC: 4 MMOL/L — LOW (ref 5–17)
APTT BLD: 39.3 SEC — HIGH (ref 24.5–35.6)
BUN SERPL-MCNC: 14 MG/DL — SIGNIFICANT CHANGE UP (ref 7–18)
CALCIUM SERPL-MCNC: 8.9 MG/DL — SIGNIFICANT CHANGE UP (ref 8.4–10.5)
CHLORIDE SERPL-SCNC: 104 MMOL/L — SIGNIFICANT CHANGE UP (ref 96–108)
CO2 SERPL-SCNC: 29 MMOL/L — SIGNIFICANT CHANGE UP (ref 22–31)
CREAT SERPL-MCNC: 0.82 MG/DL — SIGNIFICANT CHANGE UP (ref 0.5–1.3)
EGFR: 69 ML/MIN/1.73M2 — SIGNIFICANT CHANGE UP
GLUCOSE SERPL-MCNC: 104 MG/DL — HIGH (ref 70–99)
HCT VFR BLD CALC: 36.9 % — SIGNIFICANT CHANGE UP (ref 34.5–45)
HGB BLD-MCNC: 12.1 G/DL — SIGNIFICANT CHANGE UP (ref 11.5–15.5)
INR BLD: 3.17 RATIO — HIGH (ref 0.85–1.18)
M PNEUMO IGM SER-ACNC: 0.12 INDEX — SIGNIFICANT CHANGE UP (ref 0–0.9)
MCHC RBC-ENTMCNC: 32.8 GM/DL — SIGNIFICANT CHANGE UP (ref 32–36)
MCHC RBC-ENTMCNC: 33.2 PG — SIGNIFICANT CHANGE UP (ref 27–34)
MCV RBC AUTO: 101.4 FL — HIGH (ref 80–100)
MYCOPLASMA AG SPEC QL: NEGATIVE — SIGNIFICANT CHANGE UP
NRBC # BLD: 0 /100 WBCS — SIGNIFICANT CHANGE UP (ref 0–0)
PLATELET # BLD AUTO: 208 K/UL — SIGNIFICANT CHANGE UP (ref 150–400)
POTASSIUM SERPL-MCNC: 3.5 MMOL/L — SIGNIFICANT CHANGE UP (ref 3.5–5.3)
POTASSIUM SERPL-SCNC: 3.5 MMOL/L — SIGNIFICANT CHANGE UP (ref 3.5–5.3)
PROTHROM AB SERPL-ACNC: 34.9 SEC — HIGH (ref 9.5–13)
RBC # BLD: 3.64 M/UL — LOW (ref 3.8–5.2)
RBC # FLD: 13.1 % — SIGNIFICANT CHANGE UP (ref 10.3–14.5)
SODIUM SERPL-SCNC: 137 MMOL/L — SIGNIFICANT CHANGE UP (ref 135–145)
WBC # BLD: 11.26 K/UL — HIGH (ref 3.8–10.5)
WBC # FLD AUTO: 11.26 K/UL — HIGH (ref 3.8–10.5)

## 2024-02-23 PROCEDURE — 99233 SBSQ HOSP IP/OBS HIGH 50: CPT | Mod: GC

## 2024-02-23 RX ORDER — ALBUTEROL 90 UG/1
2 AEROSOL, METERED ORAL EVERY 6 HOURS
Refills: 0 | Status: COMPLETED | OUTPATIENT
Start: 2024-02-23 | End: 2025-01-21

## 2024-02-23 RX ORDER — SODIUM CHLORIDE 9 MG/ML
4 INJECTION INTRAMUSCULAR; INTRAVENOUS; SUBCUTANEOUS EVERY 8 HOURS
Refills: 0 | Status: COMPLETED | OUTPATIENT
Start: 2024-02-23 | End: 2024-02-25

## 2024-02-23 RX ORDER — ALBUTEROL 90 UG/1
2 AEROSOL, METERED ORAL EVERY 6 HOURS
Refills: 0 | Status: DISCONTINUED | OUTPATIENT
Start: 2024-02-23 | End: 2024-02-23

## 2024-02-23 RX ORDER — APIXABAN 2.5 MG/1
1 TABLET, FILM COATED ORAL
Qty: 60 | Refills: 0
Start: 2024-02-23 | End: 2024-03-23

## 2024-02-23 RX ORDER — IPRATROPIUM/ALBUTEROL SULFATE 18-103MCG
3 AEROSOL WITH ADAPTER (GRAM) INHALATION EVERY 6 HOURS
Refills: 0 | Status: DISCONTINUED | OUTPATIENT
Start: 2024-02-23 | End: 2024-02-27

## 2024-02-23 RX ADMIN — ESCITALOPRAM OXALATE 10 MILLIGRAM(S): 10 TABLET, FILM COATED ORAL at 14:15

## 2024-02-23 RX ADMIN — SODIUM CHLORIDE 4 MILLILITER(S): 9 INJECTION INTRAMUSCULAR; INTRAVENOUS; SUBCUTANEOUS at 20:10

## 2024-02-23 RX ADMIN — Medication 3 MILLILITER(S): at 14:55

## 2024-02-23 RX ADMIN — Medication 0.5 MILLIGRAM(S): at 15:55

## 2024-02-23 RX ADMIN — CEFTRIAXONE 100 MILLIGRAM(S): 500 INJECTION, POWDER, FOR SOLUTION INTRAMUSCULAR; INTRAVENOUS at 05:37

## 2024-02-23 RX ADMIN — Medication 600 MILLIGRAM(S): at 14:17

## 2024-02-23 RX ADMIN — Medication 3 MILLILITER(S): at 20:10

## 2024-02-23 RX ADMIN — Medication 40 MILLIGRAM(S): at 06:31

## 2024-02-23 RX ADMIN — ATORVASTATIN CALCIUM 40 MILLIGRAM(S): 80 TABLET, FILM COATED ORAL at 21:18

## 2024-02-23 RX ADMIN — SODIUM CHLORIDE 4 MILLILITER(S): 9 INJECTION INTRAMUSCULAR; INTRAVENOUS; SUBCUTANEOUS at 14:55

## 2024-02-23 RX ADMIN — Medication 1 SPRAY(S): at 06:30

## 2024-02-23 RX ADMIN — AZITHROMYCIN 255 MILLIGRAM(S): 500 TABLET, FILM COATED ORAL at 05:37

## 2024-02-23 RX ADMIN — Medication 75 MICROGRAM(S): at 06:31

## 2024-02-23 RX ADMIN — Medication 1 SPRAY(S): at 17:02

## 2024-02-23 NOTE — PROGRESS NOTE ADULT - PROBLEM SELECTOR PLAN 3
- On warfarin for DVT.   - P/w INR of 5.21.   - No signs of active bleed.   - Hold warfarin dose.   - F/U INR 4.87 - continue to hold warfarin now and daily PT/INT monitoring - On warfarin for DVT.   - P/w INR of 5.21.   - No signs of active bleed.   - Hold warfarin dose.   - F/U INR 4.87 - continue to hold warfarin now and daily PT/INT monitoring  - Family and Past medical history of Factor V Leiden   - Added Eliquis, which has a $47, will discuss with pt if she can afford. - On warfarin for DVT.   - P/w INR of 5.21.   - No signs of active bleed.   - Hold warfarin dose.   -  INR 4.87 - continue to hold warfarin now and daily PT/INT monitoring  - Family and Past medical history of Factor V Leiden   - Added Eliquis, which has a $47, will discuss with pt if she can afford.

## 2024-02-23 NOTE — PROGRESS NOTE ADULT - ASSESSMENT
Patient is a 87 y/o F who lives alone and walks with a walker w/ PMH of asthma, b/l DVT (20 years ago), hypothyroidism, who presented with 1 week history of worsening cough and SOB associated with fevers, and chills. CXR is suspicious for RLL consolidation. Patient is being admitted for acute hypoxic respiratory failure and sepsis 2/2 to pneumonia.   seen and examined pt at the bedside Pt is AO x 3 eating regular consistency food well without swallowing difficulty saturating well on O2 2:.min via N-C. Tolerating antibiotics well,  cultures being tested. otherwise, pt is not in distress      Patient is a 89 y/o F who lives alone and walks with a walker w/ PMH of asthma, b/l DVT (20 years ago), hypothyroidism, Factor V Leiden,  who presented with 1 week history of worsening cough and SOB associated with fevers, and chills. CXR is suspicious for RLL consolidation. Patient is being admitted for acute hypoxic respiratory failure and sepsis 2/2 to pneumonia.   seen and examined pt at the bedside Pt is AO x 3 eating regular consistency food well without swallowing difficulty saturating well on O2 2:.min via N-C. Tolerating antibiotics well,  cultures being tested. otherwise, pt is not in distress

## 2024-02-23 NOTE — PHYSICAL THERAPY INITIAL EVALUATION ADULT - IMPAIRMENTS FOUND, PT EVAL
aerobic capacity/endurance/fine motor/gait, locomotion, and balance/gross motor/integumentary integrity/muscle strength/posture

## 2024-02-23 NOTE — PHYSICAL THERAPY INITIAL EVALUATION ADULT - GAIT DISTANCE, PT EVAL
Pt performed multi-directional steps at bedside including forwards, backward, and lateral steps/10 feet Pt performed multi-directional steps at bedside including forwards, backward, and lateral steps. Limited by pt requiring rest after experiencing SOB. Pt self-reported a 7/10 on the Brent perceived exertion scale./10 feet

## 2024-02-23 NOTE — PHYSICAL THERAPY INITIAL EVALUATION ADULT - GAIT DEVIATIONS NOTED, PT EVAL
decreased luh/increased time in double stance/decreased velocity of limb motion/decreased step length/decreased stride length/decreased swing-to-stance ratio/decreased weight-shifting ability

## 2024-02-23 NOTE — PHYSICAL THERAPY INITIAL EVALUATION ADULT - PATIENT PROFILE REVIEW, REHAB EVAL
Charts reviewed including labs and imaging. Notably high INR level (4.57), conferred w/ physician to confirm pt safety to perform eval. Physician agreed pt would be safe to perform in eval./yes

## 2024-02-23 NOTE — DISCHARGE NOTE PROVIDER - HOSPITAL COURSE
Patient is a 89 y/o F who lives alone and walks with a walker w/ PMH of asthma, b/l DVT (20 years ago), hypothyroidism, Factor V Leiden,  who presented with 1 week history of worsening cough and SOB associated with fevers, and chills. CXR is suspicious for RLL consolidation. Patient is being admitted for acute hypoxic respiratory failure and sepsis 2/2 to pneumonia. Hypoxic on RA. Requiring 2L NC.  RVP negative. S/p cefepime and vanc in ED.  Urine cultures neg. Neg Mycoplasma,  strep, legionella, MRSA. ceftriaxone (2/26 end) and azithromycin (2/24 end). bcx showed xxxxx. Sputum cultures showed xxxx.    Patient had Supratherapeutic INR. She was On warfarin for DVT. INR was  5.21.  No signs of active bleed. Warfarin was held. Family and Past medical history of Factor V Leiden. Discharged on  Eliquis,     For patient's hypothyroidism she was continued on  levothyroxine.    For the patient's weakness she was seen by physical therapy who recommended REINALDO. Patient is a 87 y/o F who lives alone and walks with a walker w/ PMH of asthma, b/l DVT (20 years ago), hypothyroidism, Factor V Leiden,  who presented with 1 week history of worsening cough and SOB associated with fevers, and chills. CXR is suspicious for RLL consolidation. Patient is being admitted for acute hypoxic respiratory failure and sepsis 2/2 to pneumonia. Hypoxic on RA. Requiring 2L NC.  RVP negative. S/p cefepime and vanc in ED.  Urine cultures neg. Neg Mycoplasma,  strep, legionella, MRSA. ceftriaxone (2/26 end) and azithromycin (2/24 end). bcx were negative.     Patient had Supratherapeutic INR. She was On warfarin for DVT. INR was  5.21.  No signs of active bleed. Warfarin was held. Family and Past medical history of Factor V Leiden. Discharged on  Eliquis.     For patient's hypothyroidism she was continued on  levothyroxine.    For the patient's weakness she was seen by physical therapy who recommended REINALDO. Patient is a 87 y/o F who lives alone and walks with a walker w/ PMH of asthma, b/l DVT (20 years ago), hypothyroidism, Factor V Leiden,  who presented with 1 week history of worsening cough and SOB associated with fevers, and chills. CXR is suspicious for RLL consolidation. Patient is being admitted for acute hypoxic respiratory failure and sepsis 2/2 to pneumonia. Hypoxic on RA. Requiring 2L NC.  RVP negative. S/p cefepime and vanc in ED.  Urine cultures neg. Neg Mycoplasma,  strep, legionella, MRSA. ceftriaxone (2/26 end) and azithromycin (2/24 end). bcx were negative.     Patient had Supratherapeutic INR. She was On warfarin for DVT. INR was  5.21.  No signs of active bleed. Warfarin was held. Family and Past medical history of Factor V Leiden. Discharged on  Eliquis.     For patient's hypothyroidism she was continued on  levothyroxine. Pt to continue prednisone 40mg qd x 5 days    For the patient's weakness she was seen by physical therapy who recommended REINALDO.

## 2024-02-23 NOTE — PHYSICAL THERAPY INITIAL EVALUATION ADULT - ADDITIONAL COMMENTS
-- Prior to recent exacerbation, pt utilized SAC, RW, and rollator for ambulation, AD dependent on how she felt in that moment.   -- Pt ambulates 20 blocks w/ rollator to purchase her groceries (10 blocks there, 10 blocks back), using the rollator seat to hold groceries on the way back (limiting access to chair if needed).  -- Pt does not have HHA or someone who stays with her, however has a friend who checks in on her occasionally.

## 2024-02-23 NOTE — PROGRESS NOTE ADULT - PROBLEM SELECTOR PLAN 7
pending blood and urine cultures  PT eval - f/u   titrate down O2 as tolerated  spoke to granddaughter via phone and all questions answered pending blood and urine cultures  PT eval - f/u   titrate down O2 as tolerated  spoke to granddaughter via phone and all questions answered  - discuss co-pay for Eliquis pending blood and urine cultures  PT rosalinda NAJERA  titrate down O2 as tolerated  spoke to granddaughter via phone and all questions answered  - discuss co-pay for Eliquis

## 2024-02-23 NOTE — DISCHARGE NOTE PROVIDER - PROVIDER TOKENS
PROVIDER:[TOKEN:[[id number]],FOLLOWUP:[2 weeks]] PROVIDER:[TOKEN:[[id number]],FOLLOWUP:[2 weeks]],PROVIDER:[TOKEN:[[id number]]]

## 2024-02-23 NOTE — PHYSICAL THERAPY INITIAL EVALUATION ADULT - DIAGNOSIS, PT EVAL
(ICF Model) Pt. present w/deficits in Body Structures/Function (Impairments), incl: Strength, Balance, & Endurance, leading to deficits in performing the below noted Activities (Limitations).

## 2024-02-23 NOTE — DISCHARGE NOTE PROVIDER - NSDCCPCAREPLAN_GEN_ALL_CORE_FT
PRINCIPAL DISCHARGE DIAGNOSIS  Diagnosis: Sepsis with acute hypoxic respiratory failure  Assessment and Plan of Treatment:       SECONDARY DISCHARGE DIAGNOSES  Diagnosis: Pneumonia  Assessment and Plan of Treatment:     Diagnosis: Hypothyroidism  Assessment and Plan of Treatment:     Diagnosis: Supratherapeutic INR  Assessment and Plan of Treatment:     Diagnosis: DVT, lower extremity  Assessment and Plan of Treatment:      PRINCIPAL DISCHARGE DIAGNOSIS  Diagnosis: Sepsis with acute hypoxic respiratory failure  Assessment and Plan of Treatment: You came into the hospital due to 1 week history of worsening cough and Shortness of breath associated with fevers, and chills. You chest xray showed right lower lobe consolidation from pnuemonia. You were hypoxic on Room air, requiring 2L of nasal canula. You tested negative for flu and covid. Your Urine cultures were negatvie. You blood cultures showed xxxx. Your sputum cultures showed xxxx. We treated you with ceftriaxone and azithromycin. You were saturating well on room air and your symptoms started to resolve. We advise you follow up with your primary care doctor.      SECONDARY DISCHARGE DIAGNOSES  Diagnosis: Pneumonia  Assessment and Plan of Treatment: see above    Diagnosis: Hypothyroidism  Assessment and Plan of Treatment: You have a history of hypothyroidism. We continued you on your home medication. we advise you continue to follow up with your primary care doctor.    Diagnosis: Supratherapeutic INR  Assessment and Plan of Treatment: You came in with supratherapeutic INR indicating your levels on wafarin were not controlled. You had no signs of bleeding. We stopped your medication. We perscribed you xxxxxx. We discussed this change with your primary care doctor as well.    Diagnosis: DVT, lower extremity  Assessment and Plan of Treatment: You have a history of DVT.  We changed your warfarin to xxxxx and advise you continue to follow up with your outpatient doctors.     PRINCIPAL DISCHARGE DIAGNOSIS  Diagnosis: Sepsis with acute hypoxic respiratory failure  Assessment and Plan of Treatment: You came into the hospital due to 1 week history of worsening cough and Shortness of breath associated with fevers, and chills. You chest xray showed right lower lobe consolidation from pnuemonia. You were hypoxic on Room air, requiring 2L of nasal canula. You tested negative for flu and covid. Your Urine cultures were negatvie. You blood cultures showed xxxx. Your sputum cultures showed xxxx. We treated you with ceftriaxone and azithromycin. You were saturating well on room air and your symptoms started to resolve. We advise you follow up with your primary care doctor.      SECONDARY DISCHARGE DIAGNOSES  Diagnosis: Pneumonia  Assessment and Plan of Treatment: see above    Diagnosis: Hypothyroidism  Assessment and Plan of Treatment: You have a history of hypothyroidism. We continued you on your home medication. we advise you continue to follow up with your primary care doctor.    Diagnosis: Supratherapeutic INR  Assessment and Plan of Treatment: You came in with supratherapeutic INR indicating your levels on wafarin were not controlled. You had no signs of bleeding. We stopped your medication. We perscribed you xxxxxx. We discussed this change with your primary care doctor as well.    Diagnosis: DVT, lower extremity  Assessment and Plan of Treatment: You have a history of DVT.  We changed your warfarin to xxxxx and advise you continue to follow up with your outpatient doctors.    Diagnosis: General weakness  Assessment and Plan of Treatment: You were complaining of general weakness. We had out physical therapy team evaluate you and they recommended you go to subacute rehab for further management and rehabilitation.     PRINCIPAL DISCHARGE DIAGNOSIS  Diagnosis: Sepsis with acute hypoxic respiratory failure  Assessment and Plan of Treatment: You came into the hospital due to 1 week history of worsening cough and Shortness of breath associated with fevers, and chills. You chest xray showed right lower lobe consolidation from pnuemonia. You were hypoxic on Room air, requiring 2L of nasal canula. You tested negative for flu and covid. Your Urine cultures were negatvie. You blood cultures were negative. We treated you with ceftriaxone and azithromycin. You were saturating well on room air and your symptoms started to resolve. Please continue taking prednisone 40mg daily for 5 days. Please follow up with Pulmonologist Dr. Hicks within 1 week of discharge. We advise you follow up with your primary care doctor.      SECONDARY DISCHARGE DIAGNOSES  Diagnosis: Pneumonia  Assessment and Plan of Treatment: see above    Diagnosis: Hypothyroidism  Assessment and Plan of Treatment: You have a history of hypothyroidism. We continued you on your home medication. we advise you continue to follow up with your primary care doctor.    Diagnosis: DVT, lower extremity  Assessment and Plan of Treatment: You have a history of DVT.  We changed your warfarin to xxxxx and advise you continue to follow up with your outpatient doctors.    Diagnosis: Supratherapeutic INR  Assessment and Plan of Treatment: You came in with supratherapeutic INR indicating your levels on wafarin were not controlled. You had no signs of bleeding. We stopped your medication. We perscribed you xxxxxx. We discussed this change with your primary care doctor as well.    Diagnosis: General weakness  Assessment and Plan of Treatment: You were complaining of general weakness. We had out physical therapy team evaluate you and they recommended you go to subacute rehab for further management and rehabilitation.

## 2024-02-23 NOTE — PHYSICAL THERAPY INITIAL EVALUATION ADULT - PLANNED THERAPY INTERVENTIONS, PT EVAL
balance training/gait training/postural re-education/strengthening/transfer training aerobic capacity/endurance training/balance training/gait training/postural re-education/strengthening/transfer training

## 2024-02-23 NOTE — DISCHARGE NOTE PROVIDER - NSDCMRMEDTOKEN_GEN_ALL_CORE_FT
ALPRAZolam 0.5 mg oral tablet: 1 tab(s) orally 2 times a day  dextromethorphan 15 mg/5 mL oral syrup: 10 milliliter(s) orally every 8 hours  Eliquis 5 mg oral tablet: 1 tab(s) orally 2 times a day  escitalopram 5 mg oral tablet: 1 tab(s) orally once a day  fluticasone 50 mcg/inh nasal spray: 1 spray(s) in each nostril 2 times a day  Lasix 40 mg oral tablet: 1 tab(s) orally once a day  levothyroxine 75 mcg (0.075 mg) oral tablet: 1 tab(s) orally once a day  rosuvastatin 10 mg oral tablet: 1 tab(s) orally once a day  warfarin 2.5 mg oral tablet: 1 tab(s) orally once a day   ALPRAZolam 0.5 mg oral tablet: 1 tab(s) orally 2 times a day  amLODIPine 5 mg oral tablet: 1 tab(s) orally once a day  Eliquis 5 mg oral tablet: 1 tab(s) orally 2 times a day  escitalopram 5 mg oral tablet: 1 tab(s) orally once a day  fluticasone 50 mcg/inh nasal spray: 1 spray(s) in each nostril 2 times a day  Lasix 40 mg oral tablet: 1 tab(s) orally once a day  levothyroxine 75 mcg (0.075 mg) oral tablet: 1 tab(s) orally once a day  predniSONE 20 mg oral tablet: 2 tab(s) orally once a day  rosuvastatin 10 mg oral tablet: 1 tab(s) orally once a day

## 2024-02-23 NOTE — PROGRESS NOTE ADULT - ATTENDING COMMENTS
Patient is a 89 y/o F who lives alone and walks with a walker w/ PMH of asthma, b/l DVT (20 years ago), factor V Leiden deficiency,  hypothyroidism, who presented with 1 week history of worsening cough and SOB associated with fevers, and chills. CXR is suspicious for RLL consolidation. Patient is being admitted for acute hypoxic respiratory failure and sepsis 2/2 to pneumonia.    Patient was seen and examined, still w/ cough and feels as if she is not able to expectorate and has mucus stuck.     Labs reviewed- cbc, bmp, INR    PE as above   b/l expiratory wheezing       A/P:  #Acute hypoxic respiratory failure- 2L NC   #Sepsis - resolved   #Community acquired pneumonia - multi-focal   #Asthma  #H/o b/l DVT  #Supra- therapeutic inr   #Hypothyroidism     Plan:  -C/w IV abx, follow sputum cx, blood cx, legionella, mycoplasma- NTD. Blood cx NTD   -Start Duoneb, start 3% nebs and Mucinex for mucolytic effect.    -c/w supplemental O2   -INR elevated, continue to hold coumadin. No evidence of bleeding at this time. Patient reports hx of factor v Leiden deficiency, hence,  has been on long term AC. Medical team reached out to PCP as well and discussed the possibility of switching over to NOAC which he is in agreement with. Will send to pharmacy and run with her insurance   -c/w levothyroxine.   -PT recommended [facility], family to give choices   -Spoke w/ daughter at length

## 2024-02-23 NOTE — PHYSICAL THERAPY INITIAL EVALUATION ADULT - ASSISTIVE DEVICE FOR TRANSFER: GAIT, REHAB EVAL
Verified Results  FIRST TRIMESTER PRENATAL SCREEN [id number] 12:01AM CLARIBEL MCKEON     Test Name Result Flag Reference   DOWN SYNDROME 1 in >10,000     DOWN SYNDROME INTERP WITHIN RANGE     FREE BETA hCG 0.68     COMMENT      Non-computer report with graphs will be sent to the ordering physician.   NUCHAL TRANSLUCENCY SEE COMMENT     See Comment  -0.53   ALPHA-FETOPROTEIN 2.28     PAPPA-A 1.6     TRISOMY 18 1 in >10,000     TRISOMY 18 INTERP WITHIN RANGE         
rolling walker

## 2024-02-23 NOTE — PHYSICAL THERAPY INITIAL EVALUATION ADULT - GENERAL OBSERVATIONS, REHAB EVAL
Consult received, chart reviewed. Patient received supine in bed, NAD, + IV, + Purewick, + alarm. Patient agreed to EVALUATION from Physical Therapist.

## 2024-02-23 NOTE — PROGRESS NOTE ADULT - PROBLEM SELECTOR PLAN 4
On warfarin.   - Hold in setting of supratherapeutic INR. On warfarin.   - Hold in setting of supratherapeutic INR.  - see above

## 2024-02-23 NOTE — PROGRESS NOTE ADULT - PROBLEM SELECTOR PLAN 1
- P/w cough, SOB, fevers, chills.   - Met sepsis criteria (leukocytosis, febrile. No tachypnea and tachycardia)  - Hypoxic on RA. Requiring 2L NC.   - RVP negative.   - CXR -  as above   - S/p cefepime and vanc in ED.   - S/p 1L NS in ED.   - C/w ceftriaxone and azithromycin.   - F/U Bcx and urine culture ( UA was negative)   - F/U sputum cx.   - F/U mycoplasma, strep, legionella, MRSA. - P/w cough, SOB, fevers, chills.   - Met sepsis criteria (leukocytosis, febrile. No tachypnea and tachycardia)  - Hypoxic on RA. Requiring 2L NC.   - RVP negative.   - CXR -  as above   - S/p cefepime and vanc in ED.   - S/p 1L NS in ED.   - C/w ceftriaxone (2/26 end) and azithromycin (2/24 end).   - F/U Bcx and urine culture ( UA was negative)   - F/U sputum cx.   - F/U mycoplasma, strep, legionella, MRSA (all neg). - P/w cough, SOB, fevers, chills.   - Met sepsis criteria (leukocytosis, febrile. No tachypnea and tachycardia)  - Hypoxic on RA. Requiring 2L NC.   - RVP negative.   - CXR -  as above   - S/p cefepime and vanc in ED.   - S/p 1L NS in ED.   - C/w ceftriaxone (2/26 end) and azithromycin (2/24 end).   - F/U Bcx   -urine culture ( UA was negative)   - mycoplasma, strep, legionella, MRSA (all neg).  - F/U sputum cx.

## 2024-02-23 NOTE — PROGRESS NOTE ADULT - SUBJECTIVE AND OBJECTIVE BOX
PGY-1 Progress Note discussed with attending    PAGER #: [[phone]] TILL 5:00 PM  PLEASE CONTACT ON CALL TEAM:  - On Call Team (Please refer to Sindi) FROM 5:00 PM - 8:30PM  - Nightfloat Team FROM 8:30 -7:30 AM    CHIEF COMPLAINT & BRIEF HOSPITAL COURSE: No acute overnight events. Seen patient at bedside . Reports breathing is improving.     INTERVAL HPI/OVERNIGHT EVENTS:   MEDICATIONS  (STANDING):  atorvastatin 40 milliGRAM(s) Oral at bedtime  azithromycin  IVPB 500 milliGRAM(s) IV Intermittent every 24 hours  cefTRIAXone   IVPB 1000 milliGRAM(s) IV Intermittent every 24 hours  escitalopram 10 milliGRAM(s) Oral daily  fluticasone propionate 50 MICROgram(s)/spray Nasal Spray 1 Spray(s) Both Nostrils two times a day  furosemide    Tablet 40 milliGRAM(s) Oral daily  levothyroxine 75 MICROGram(s) Oral daily    MEDICATIONS  (PRN):  acetaminophen     Tablet .. 650 milliGRAM(s) Oral every 6 hours PRN Temp greater or equal to 38C (100.4F), Mild Pain (1 - 3)  ALPRAZolam 0.5 milliGRAM(s) Oral at bedtime PRN insomnia/anxiety  guaifenesin/dextromethorphan Oral Liquid 10 milliLiter(s) Oral every 4 hours PRN Cough  ondansetron Injectable 4 milliGRAM(s) IV Push every 8 hours PRN Nausea and/or Vomiting      REVIEW OF SYSTEMS:  CONSTITUTIONAL: No fever, weight loss, or fatigue  RESPIRATORY: No shortness of breath  CARDIOVASCULAR: No chest pain  GASTROINTESTINAL: No abdominal pain.  GENITOURINARY: No dysuria  NEUROLOGICAL: No headaches  SKIN: No itching, burning, rashes    Vital Signs Last 24 Hrs  T(C): 36.8 (23 Feb 2024 05:49), Max: 37.6 (22 Feb 2024 16:05)  T(F): 98.3 (23 Feb 2024 05:49), Max: 99.6 (22 Feb 2024 16:05)  HR: 95 (23 Feb 2024 09:00) (83 - 95)  BP: 152/74 (23 Feb 2024 09:00) (127/69 - 167/64)  BP(mean): --  RR: 19 (23 Feb 2024 05:49) (18 - 20)  SpO2: 94% (23 Feb 2024 09:00) (94% - 97%)    Parameters below as of 23 Feb 2024 09:00  Patient On (Oxygen Delivery Method): nasal cannula  O2 Flow (L/min): 2      PHYSICAL EXAMINATION:  GENERAL: NAD, well built  HEAD:  Atraumatic, Normocephalic  EYES:  conjunctiva and sclera clear  CHEST/LUNG: Clear to auscultation. No rales, rhonchi, wheezing, or rubs  HEART: Regular rate and rhythm; No murmurs, rubs, or gallops  ABDOMEN: Soft, Nontender, Nondistended; Bowel sounds present  NERVOUS SYSTEM:  Alert & Oriented X3,    EXTREMITIES:  2+ Peripheral Pulses, No clubbing, cyanosis, or edema  SKIN: warm dry                          12.1   11.26 )-----------( 208      ( 23 Feb 2024 08:58 )             36.9     02-23    137  |  104  |  14  ----------------------------<  104<H>  3.5   |  29  |  0.82    Ca    8.9      23 Feb 2024 08:58  Phos  2.2     02-22  Mg     2.1     02-22    TPro  7.1  /  Alb  2.9<L>  /  TBili  0.7  /  DBili  x   /  AST  58<H>  /  ALT  22  /  AlkPhos  75  02-21    LIVER FUNCTIONS - ( 21 Feb 2024 16:50 )  Alb: 2.9 g/dL / Pro: 7.1 g/dL / ALK PHOS: 75 U/L / ALT: 22 U/L DA / AST: 58 U/L / GGT: x               PT/INR - ( 23 Feb 2024 08:58 )   PT: 34.9 sec;   INR: 3.17 ratio         PTT - ( 23 Feb 2024 08:58 )  PTT:39.3 sec    CAPILLARY BLOOD GLUCOSE      RADIOLOGY & ADDITIONAL TESTS:                   PGY-1 Progress Note discussed with attending    PAGER #: [[phone]] TILL 5:00 PM  PLEASE CONTACT ON CALL TEAM:  - On Call Team (Please refer to Sindi) FROM 5:00 PM - 8:30PM  - Nightfloat Team FROM 8:30 -7:30 AM    CHIEF COMPLAINT & BRIEF HOSPITAL COURSE: No acute overnight events. Seen patient at bedside . Reports breathing is improving. Pt admits SOB. She denies any fever, chills, CP, nausea, vomiting, abdominal pain. Pt asks for cough syrup.     INTERVAL HPI/OVERNIGHT EVENTS:   MEDICATIONS  (STANDING):  atorvastatin 40 milliGRAM(s) Oral at bedtime  azithromycin  IVPB 500 milliGRAM(s) IV Intermittent every 24 hours  cefTRIAXone   IVPB 1000 milliGRAM(s) IV Intermittent every 24 hours  escitalopram 10 milliGRAM(s) Oral daily  fluticasone propionate 50 MICROgram(s)/spray Nasal Spray 1 Spray(s) Both Nostrils two times a day  furosemide    Tablet 40 milliGRAM(s) Oral daily  levothyroxine 75 MICROGram(s) Oral daily    MEDICATIONS  (PRN):  acetaminophen     Tablet .. 650 milliGRAM(s) Oral every 6 hours PRN Temp greater or equal to 38C (100.4F), Mild Pain (1 - 3)  ALPRAZolam 0.5 milliGRAM(s) Oral at bedtime PRN insomnia/anxiety  guaifenesin/dextromethorphan Oral Liquid 10 milliLiter(s) Oral every 4 hours PRN Cough  ondansetron Injectable 4 milliGRAM(s) IV Push every 8 hours PRN Nausea and/or Vomiting      REVIEW OF SYSTEMS:  CONSTITUTIONAL: No fever, weight loss, or fatigue   RESPIRATORY: + shortness of breath  CARDIOVASCULAR: No chest pain  GASTROINTESTINAL: No abdominal pain.  GENITOURINARY: No dysuria  NEUROLOGICAL: No headaches  SKIN: No itching, burning, rashes    Vital Signs Last 24 Hrs  T(C): 36.8 (23 Feb 2024 05:49), Max: 37.6 (22 Feb 2024 16:05)  T(F): 98.3 (23 Feb 2024 05:49), Max: 99.6 (22 Feb 2024 16:05)  HR: 95 (23 Feb 2024 09:00) (83 - 95)  BP: 152/74 (23 Feb 2024 09:00) (127/69 - 167/64)  BP(mean): --  RR: 19 (23 Feb 2024 05:49) (18 - 20)  SpO2: 94% (23 Feb 2024 09:00) (94% - 97%)    Parameters below as of 23 Feb 2024 09:00  Patient On (Oxygen Delivery Method): nasal cannula  O2 Flow (L/min): 2      PHYSICAL EXAMINATION:  GENERAL: NAD, well built  HEAD:  Atraumatic, Normocephalic  EYES:  conjunctiva and sclera clear  CHEST/LUNG: No rales, rhonchi, or rubs.  +wheezing b/l  HEART: Regular rate and rhythm; No murmurs, rubs, or gallops  ABDOMEN: Soft, Nontender, Nondistended; Bowel sounds present  NERVOUS SYSTEM:  Alert & Oriented X3,    EXTREMITIES:  2+ Peripheral Pulses, No clubbing, cyanosis, or edema  SKIN: warm dry                          12.1   11.26 )-----------( 208      ( 23 Feb 2024 08:58 )             36.9     02-23    137  |  104  |  14  ----------------------------<  104<H>  3.5   |  29  |  0.82    Ca    8.9      23 Feb 2024 08:58  Phos  2.2     02-22  Mg     2.1     02-22    TPro  7.1  /  Alb  2.9<L>  /  TBili  0.7  /  DBili  x   /  AST  58<H>  /  ALT  22  /  AlkPhos  75  02-21    LIVER FUNCTIONS - ( 21 Feb 2024 16:50 )  Alb: 2.9 g/dL / Pro: 7.1 g/dL / ALK PHOS: 75 U/L / ALT: 22 U/L DA / AST: 58 U/L / GGT: x               PT/INR - ( 23 Feb 2024 08:58 )   PT: 34.9 sec;   INR: 3.17 ratio         PTT - ( 23 Feb 2024 08:58 )  PTT:39.3 sec    CAPILLARY BLOOD GLUCOSE      RADIOLOGY & ADDITIONAL TESTS:                   PGY-1 Progress Note discussed with attending    PAGER #: [[phone]] TILL 5:00 PM  PLEASE CONTACT ON CALL TEAM:  - On Call Team (Please refer to Sindi) FROM 5:00 PM - 8:30PM  - Nightfloat Team FROM 8:30 -7:30 AM    CHIEF COMPLAINT & BRIEF HOSPITAL COURSE: No acute overnight events. Seen patient at bedside . Reports breathing is improving. She denies any fever, chills, CP, nausea, vomiting, abdominal pain. Pt asks for cough syrup.     INTERVAL HPI/OVERNIGHT EVENTS:   MEDICATIONS  (STANDING):  atorvastatin 40 milliGRAM(s) Oral at bedtime  azithromycin  IVPB 500 milliGRAM(s) IV Intermittent every 24 hours  cefTRIAXone   IVPB 1000 milliGRAM(s) IV Intermittent every 24 hours  escitalopram 10 milliGRAM(s) Oral daily  fluticasone propionate 50 MICROgram(s)/spray Nasal Spray 1 Spray(s) Both Nostrils two times a day  furosemide    Tablet 40 milliGRAM(s) Oral daily  levothyroxine 75 MICROGram(s) Oral daily    MEDICATIONS  (PRN):  acetaminophen     Tablet .. 650 milliGRAM(s) Oral every 6 hours PRN Temp greater or equal to 38C (100.4F), Mild Pain (1 - 3)  ALPRAZolam 0.5 milliGRAM(s) Oral at bedtime PRN insomnia/anxiety  guaifenesin/dextromethorphan Oral Liquid 10 milliLiter(s) Oral every 4 hours PRN Cough  ondansetron Injectable 4 milliGRAM(s) IV Push every 8 hours PRN Nausea and/or Vomiting      REVIEW OF SYSTEMS:  CONSTITUTIONAL: No fever, weight loss, or fatigue   RESPIRATORY: + shortness of breath  CARDIOVASCULAR: No chest pain  GASTROINTESTINAL: No abdominal pain.  GENITOURINARY: No dysuria  NEUROLOGICAL: No headaches  SKIN: No itching, burning, rashes    Vital Signs Last 24 Hrs  T(C): 36.8 (23 Feb 2024 05:49), Max: 37.6 (22 Feb 2024 16:05)  T(F): 98.3 (23 Feb 2024 05:49), Max: 99.6 (22 Feb 2024 16:05)  HR: 95 (23 Feb 2024 09:00) (83 - 95)  BP: 152/74 (23 Feb 2024 09:00) (127/69 - 167/64)  BP(mean): --  RR: 19 (23 Feb 2024 05:49) (18 - 20)  SpO2: 94% (23 Feb 2024 09:00) (94% - 97%)    Parameters below as of 23 Feb 2024 09:00  Patient On (Oxygen Delivery Method): nasal cannula  O2 Flow (L/min): 2      PHYSICAL EXAMINATION:  GENERAL: NAD, well built  HEAD:  Atraumatic, Normocephalic  EYES:  conjunctiva and sclera clear  CHEST/LUNG: No rales, rhonchi, or rubs.  +wheezing b/l  HEART: Regular rate and rhythm; No murmurs, rubs, or gallops  ABDOMEN: Soft, Nontender, Nondistended; Bowel sounds present  NERVOUS SYSTEM:  Alert & Oriented X3,    EXTREMITIES:  2+ Peripheral Pulses, No clubbing, cyanosis, or edema  SKIN: warm dry                          12.1   11.26 )-----------( 208      ( 23 Feb 2024 08:58 )             36.9     02-23    137  |  104  |  14  ----------------------------<  104<H>  3.5   |  29  |  0.82    Ca    8.9      23 Feb 2024 08:58  Phos  2.2     02-22  Mg     2.1     02-22    TPro  7.1  /  Alb  2.9<L>  /  TBili  0.7  /  DBili  x   /  AST  58<H>  /  ALT  22  /  AlkPhos  75  02-21    LIVER FUNCTIONS - ( 21 Feb 2024 16:50 )  Alb: 2.9 g/dL / Pro: 7.1 g/dL / ALK PHOS: 75 U/L / ALT: 22 U/L DA / AST: 58 U/L / GGT: x               PT/INR - ( 23 Feb 2024 08:58 )   PT: 34.9 sec;   INR: 3.17 ratio         PTT - ( 23 Feb 2024 08:58 )  PTT:39.3 sec    CAPILLARY BLOOD GLUCOSE      RADIOLOGY & ADDITIONAL TESTS:

## 2024-02-23 NOTE — DISCHARGE NOTE PROVIDER - CARE PROVIDER_API CALL
Reji Ferrera.  [facility] Medicine  [address]  [place] [address]  Phone: [phone]  Fax: [phone]  Follow Up Time: 2 weeks   Reji Ferrera  [facility] Medicine  [address]  [place] [address]  Phone: [phone]  Fax: [phone]  Follow Up Time: 2 weeks    Caitlin Hicks  Pulmonary Disease  [address], [address]  [place] [address]  Phone: [phone]  Fax: [phone]  Follow Up Time:

## 2024-02-23 NOTE — PHYSICAL THERAPY INITIAL EVALUATION ADULT - FUNCTIONAL LIMITATIONS, PT EVAL
DATE OF SURGERY:  11/01/2017    SURGEON:  Kee Gunn MD    REFERRING PHYSICIAN: Kee Gunn MD    ASSISTANT:  Yanira Deluca PA-C     Please note the physician's assistant was critical in assisting throughout the entirety of procedure, including positioning of the patient, maintaining the position of the shoulder throughout the procedure, and performing the entirety of the right shoulder surgery.    PREOPERATIVE DIAGNOSIS(ES):    1. Right shoulder subacromial impingement.  2. 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  3. History of prior total shoulder arthroplasty.    POSTOPERATIVE DIAGNOSIS(ES):    1. Subacromial impingement, right shoulder.  2. History of prior total shoulder arthroplasty.  3. Intact and healed subscapularis.  4. No evidence of rotator cuff tear.  5. Synovitis, glenohumeral joint.  6. 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    PROCEDURE:    1. Right shoulder arthroscopic extensive debridement of glenohumeral joint.  2. Right shoulder arthroscopic subacromial decompression with anterior acromioplasty.  3. Right shoulder arthroscopic biopsy for fresh frozen.  4. Cultures, right shoulder.    COMPLICATIONS ENCOUNTERED:  None.    ANESTHESIA:  General.    BLOOD LOSS:  Minimal.    INDICATIONS FOR PROCEDURE:  A 70-year-old female, who has had a prior right total shoulder arthroplasty performed by Dr. Cruz Braxton, continues to have pain and discomfort and was referred to me for management.  Examination, history, and imaging studies were consistent with the aforementioned diagnoses.  Risks, benefits, and surgical events were discussed with her in detail.  The operative site was confirmed in the preop holding area and a time-out was taken in the room to view the consent.  Preoperative antibiotics were held until cultures were obtained and then we provided her with Ancef based on weight.  Fresh frozen results pathology called into the room and reported that there was less  than 5 white cells per high-powered field.  No signs of acute inflammation.    DETAILS OF PROCEDURE:  The patient was taken to the operating room, sedated general anesthesia was administered.  She was placed in a beach chair position.  Both upper and lower extremities were well padded and protected.  Her head was held in neutral position with the aid of a [employer] beach chair positioner.  The right upper extremity was then prepped and draped in a sterile fashion with the aid of ChloraPrep and a standard examination was performed.  There was no evidence of instability.  No signs of adhesive capsulitis.     Glenohumeral arthroscopy, cultures, biopsy, and extensive debridement:  A standard posterior portal was made and an anterior portal was made under direct visualization.  We started the procedure as a dry scope without fluid.  There was no substantial fluid flash upon inserting the arthroscope.  The glenoid component was stable.  There was no evidence of loosening or instability.  It was also intact.  The humeral head was intact.  There was substantial synovitis within the glenohumeral joint.  Anteriorly, superiorly, and posteriorly, there were adhesions around the subscapularis.  The superior rotator cuff was intact.  We utilized a pituitary arthroscopic instrument to take tissue bites and we sent 5 separate cultures.  Again this was all done through a sterile cannula under dry scope.  Once the 5 cultures all labeled 1, 2, 3, 4, and 5 were sent.  We provided the antibiotics and then started the arthroscopic fluid.  We took tissue for fresh frozen with the same pituitary grasper and sent that to pathology and the report came back with less than 5 white cells per high-powered field.  No signs of acute inflammation.  We debrided the synovitis and the adhesions around the subscapularis anteriorly, superiorly, as well as posteriorly.  Being pleased with the extensive debridement of glenohumeral joint, specifically the  adhesions of synovitis having performed the tissue biopsy.  We turned our attention to the subacromial space.     Arthroscopic subacromial decompression with anterior acromioplasty:  We inserted the arthroscope into the subacromial space and we encountered a very thick bursitis.  A lateral portal was made for subacromial work and a bursectomy was performed.  The coracoacromial ligament was noted to be significantly frayed.  This was released with electrocautery and a large anterior acromial spur was encountered.  An anterior acromioplasty was performed with the bur, converting the acromion morphology to a type 1 acromion, taking care to protect the deltoid fascia throughout the procedure.   Being pleased with the arthroscopic subacromial decompression with anterior acromioplasty, we inspected the bursal side of the rotator cuff.     We probed the bursal side of the rotator cuff and although there were changes consistent with impingement, there was no evidence of tear.  We copiously irrigated the shoulder and closed the arthroscopic portals, 4-0 subcuticular Monocryl followed by Mastisol, Steri-Strips, sterile gauze dressings, and a sling were then applied.  She was awakened in the room and taken to recovery in stable condition.        ______________________________  Kee Gunn MD  [id number]      D:  11/01/2017 10:23:56  T:  11/01/2017 11:37:53   RI/kwame   Job:  [id number]          self-care/home management/community/leisure

## 2024-02-23 NOTE — DISCHARGE NOTE PROVIDER - CARE PROVIDERS DIRECT ADDRESSES
[email or web address]

## 2024-02-24 DIAGNOSIS — I10 ESSENTIAL (PRIMARY) HYPERTENSION: ICD-10-CM

## 2024-02-24 LAB
ALBUMIN SERPL ELPH-MCNC: 2.5 G/DL — LOW (ref 3.5–5)
ALP SERPL-CCNC: 106 U/L — SIGNIFICANT CHANGE UP (ref 40–120)
ALT FLD-CCNC: 42 U/L DA — SIGNIFICANT CHANGE UP (ref 10–60)
ANION GAP SERPL CALC-SCNC: 3 MMOL/L — LOW (ref 5–17)
AST SERPL-CCNC: 36 U/L — SIGNIFICANT CHANGE UP (ref 10–40)
BILIRUB SERPL-MCNC: 0.4 MG/DL — SIGNIFICANT CHANGE UP (ref 0.2–1.2)
BUN SERPL-MCNC: 9 MG/DL — SIGNIFICANT CHANGE UP (ref 7–18)
CALCIUM SERPL-MCNC: 9.2 MG/DL — SIGNIFICANT CHANGE UP (ref 8.4–10.5)
CHLORIDE SERPL-SCNC: 102 MMOL/L — SIGNIFICANT CHANGE UP (ref 96–108)
CO2 SERPL-SCNC: 32 MMOL/L — HIGH (ref 22–31)
CREAT SERPL-MCNC: 0.73 MG/DL — SIGNIFICANT CHANGE UP (ref 0.5–1.3)
EGFR: 80 ML/MIN/1.73M2 — SIGNIFICANT CHANGE UP
GLUCOSE SERPL-MCNC: 115 MG/DL — HIGH (ref 70–99)
HCT VFR BLD CALC: 36.7 % — SIGNIFICANT CHANGE UP (ref 34.5–45)
HGB BLD-MCNC: 12.1 G/DL — SIGNIFICANT CHANGE UP (ref 11.5–15.5)
MAGNESIUM SERPL-MCNC: 1.9 MG/DL — SIGNIFICANT CHANGE UP (ref 1.6–2.6)
MCHC RBC-ENTMCNC: 32.1 PG — SIGNIFICANT CHANGE UP (ref 27–34)
MCHC RBC-ENTMCNC: 33 GM/DL — SIGNIFICANT CHANGE UP (ref 32–36)
MCV RBC AUTO: 97.3 FL — SIGNIFICANT CHANGE UP (ref 80–100)
NRBC # BLD: 0 /100 WBCS — SIGNIFICANT CHANGE UP (ref 0–0)
PHOSPHATE SERPL-MCNC: 2.9 MG/DL — SIGNIFICANT CHANGE UP (ref 2.5–4.5)
PLATELET # BLD AUTO: 237 K/UL — SIGNIFICANT CHANGE UP (ref 150–400)
POTASSIUM SERPL-MCNC: 3.2 MMOL/L — LOW (ref 3.5–5.3)
POTASSIUM SERPL-SCNC: 3.2 MMOL/L — LOW (ref 3.5–5.3)
PROT SERPL-MCNC: 6.6 G/DL — SIGNIFICANT CHANGE UP (ref 6–8.3)
RBC # BLD: 3.77 M/UL — LOW (ref 3.8–5.2)
RBC # FLD: 12.9 % — SIGNIFICANT CHANGE UP (ref 10.3–14.5)
SODIUM SERPL-SCNC: 137 MMOL/L — SIGNIFICANT CHANGE UP (ref 135–145)
WBC # BLD: 8.48 K/UL — SIGNIFICANT CHANGE UP (ref 3.8–10.5)
WBC # FLD AUTO: 8.48 K/UL — SIGNIFICANT CHANGE UP (ref 3.8–10.5)

## 2024-02-24 PROCEDURE — 99232 SBSQ HOSP IP/OBS MODERATE 35: CPT | Mod: GC

## 2024-02-24 RX ORDER — POTASSIUM CHLORIDE 20 MEQ
40 PACKET (EA) ORAL ONCE
Refills: 0 | Status: COMPLETED | OUTPATIENT
Start: 2024-02-24 | End: 2024-02-24

## 2024-02-24 RX ORDER — AMLODIPINE BESYLATE 2.5 MG/1
5 TABLET ORAL DAILY
Refills: 0 | Status: DISCONTINUED | OUTPATIENT
Start: 2024-02-24 | End: 2024-02-26

## 2024-02-24 RX ADMIN — Medication 100 MILLIGRAM(S): at 13:39

## 2024-02-24 RX ADMIN — Medication 3 MILLILITER(S): at 20:23

## 2024-02-24 RX ADMIN — CEFTRIAXONE 100 MILLIGRAM(S): 500 INJECTION, POWDER, FOR SOLUTION INTRAMUSCULAR; INTRAVENOUS at 05:34

## 2024-02-24 RX ADMIN — Medication 3 MILLILITER(S): at 09:29

## 2024-02-24 RX ADMIN — Medication 1 SPRAY(S): at 18:15

## 2024-02-24 RX ADMIN — Medication 40 MILLIGRAM(S): at 05:33

## 2024-02-24 RX ADMIN — Medication 600 MILLIGRAM(S): at 05:33

## 2024-02-24 RX ADMIN — ATORVASTATIN CALCIUM 40 MILLIGRAM(S): 80 TABLET, FILM COATED ORAL at 21:45

## 2024-02-24 RX ADMIN — AZITHROMYCIN 255 MILLIGRAM(S): 500 TABLET, FILM COATED ORAL at 05:34

## 2024-02-24 RX ADMIN — SODIUM CHLORIDE 4 MILLILITER(S): 9 INJECTION INTRAMUSCULAR; INTRAVENOUS; SUBCUTANEOUS at 09:29

## 2024-02-24 RX ADMIN — Medication 1 SPRAY(S): at 05:34

## 2024-02-24 RX ADMIN — Medication 0.5 MILLIGRAM(S): at 21:46

## 2024-02-24 RX ADMIN — SODIUM CHLORIDE 4 MILLILITER(S): 9 INJECTION INTRAMUSCULAR; INTRAVENOUS; SUBCUTANEOUS at 20:23

## 2024-02-24 RX ADMIN — Medication 40 MILLIEQUIVALENT(S): at 09:21

## 2024-02-24 RX ADMIN — ESCITALOPRAM OXALATE 10 MILLIGRAM(S): 10 TABLET, FILM COATED ORAL at 11:09

## 2024-02-24 RX ADMIN — Medication 75 MICROGRAM(S): at 05:33

## 2024-02-24 RX ADMIN — AMLODIPINE BESYLATE 5 MILLIGRAM(S): 2.5 TABLET ORAL at 11:09

## 2024-02-24 RX ADMIN — Medication 3 MILLILITER(S): at 15:28

## 2024-02-24 RX ADMIN — Medication 600 MILLIGRAM(S): at 18:15

## 2024-02-24 NOTE — PROGRESS NOTE ADULT - SUBJECTIVE AND OBJECTIVE BOX
PGY-1 Progress Note discussed with attending    PAGER #: [[phone]] TILL 5:00 PM  PLEASE CONTACT ON CALL TEAM:  - On Call Team (Please refer to Sindi) FROM 5:00 PM - 8:30PM  - Nightfloat Team FROM 8:30 -7:30 AM    CHIEF COMPLAINT & BRIEF HOSPITAL COURSE: No acute overnight events. Seen patient at bedside. She is currently on 2L.    INTERVAL HPI/OVERNIGHT EVENTS:   MEDICATIONS  (STANDING):  albuterol/ipratropium for Nebulization 3 milliLiter(s) Nebulizer every 6 hours  atorvastatin 40 milliGRAM(s) Oral at bedtime  cefTRIAXone   IVPB 1000 milliGRAM(s) IV Intermittent every 24 hours  escitalopram 10 milliGRAM(s) Oral daily  fluticasone propionate 50 MICROgram(s)/spray Nasal Spray 1 Spray(s) Both Nostrils two times a day  furosemide    Tablet 40 milliGRAM(s) Oral daily  guaiFENesin  milliGRAM(s) Oral every 12 hours  levothyroxine 75 MICROGram(s) Oral daily  sodium chloride 3%  Inhalation 4 milliLiter(s) Inhalation every 8 hours    MEDICATIONS  (PRN):  acetaminophen     Tablet .. 650 milliGRAM(s) Oral every 6 hours PRN Temp greater or equal to 38C (100.4F), Mild Pain (1 - 3)  ALPRAZolam 0.5 milliGRAM(s) Oral at bedtime PRN insomnia/anxiety  ondansetron Injectable 4 milliGRAM(s) IV Push every 8 hours PRN Nausea and/or Vomiting      REVIEW OF SYSTEMS:  CONSTITUTIONAL: No fever, weight loss, or fatigue  RESPIRATORY: No shortness of breath  CARDIOVASCULAR: No chest pain  GASTROINTESTINAL: No abdominal pain.  GENITOURINARY: No dysuria  NEUROLOGICAL: No headaches  SKIN: No itching, burning, rashes    Vital Signs Last 24 Hrs  T(C): 36.5 (24 Feb 2024 04:45), Max: 37 (23 Feb 2024 20:27)  T(F): 97.7 (24 Feb 2024 04:45), Max: 98.6 (23 Feb 2024 20:27)  HR: 88 (24 Feb 2024 04:45) (88 - 95)  BP: 178/91 (24 Feb 2024 04:45) (140/83 - 178/91)  BP(mean): 120 (24 Feb 2024 04:45) (120 - 120)  RR: 18 (24 Feb 2024 04:45) (18 - 20)  SpO2: 95% (24 Feb 2024 04:45) (89% - 95%)    Parameters below as of 24 Feb 2024 04:45  Patient On (Oxygen Delivery Method): nasal cannula  O2 Flow (L/min): 2      PHYSICAL EXAMINATION:  GENERAL: NAD, well built  HEAD:  Atraumatic, Normocephalic  EYES:  conjunctiva and sclera clear  CHEST/LUNG: Clear to auscultation. No rales, rhonchi, wheezing, or rubs  HEART: Regular rate and rhythm; No murmurs, rubs, or gallops  ABDOMEN: Soft, Nontender, Nondistended; Bowel sounds present  NERVOUS SYSTEM:  Alert & Oriented X3,    EXTREMITIES:  2+ Peripheral Pulses, No clubbing, cyanosis, or edema  SKIN: warm dry                          12.1   11.26 )-----------( 208      ( 23 Feb 2024 08:58 )             36.9     02-23    137  |  104  |  14  ----------------------------<  104<H>  3.5   |  29  |  0.82    Ca    8.9      23 Feb 2024 08:58            PT/INR - ( 23 Feb 2024 08:58 )   PT: 34.9 sec;   INR: 3.17 ratio         PTT - ( 23 Feb 2024 08:58 )  PTT:39.3 sec    CAPILLARY BLOOD GLUCOSE      RADIOLOGY & ADDITIONAL TESTS:

## 2024-02-24 NOTE — PROGRESS NOTE ADULT - PROBLEM SELECTOR PLAN 1
11/15/23 0951   Post-Acute Status   Post-Acute Authorization Placement   Post-Acute Placement Status Referrals Sent   Discharge Plan   Discharge Plan A Inpatient Hospice   Discharge Plan B Other  (TBD)     New referral. I spoke to all of the patient's children regarding the patient's discharge plan of care. They are all in agreement with placement in [place]. The patient's family all agreed to [facility].     Addendum: Spoke to Khushi with [facility] who informed me that they will not be able to accept this patient due to the Medicare guidelines for inpatient hospice care. The patient's children were informed. They all agreed to move forward with placement at [facility].     - P/w cough, SOB, fevers, chills.   - Met sepsis criteria (leukocytosis, febrile. No tachypnea and tachycardia)  - Hypoxic on RA. Requiring 2L NC.   - RVP negative.   - CXR -  as above   - S/p cefepime and vanc in ED.   - S/p 1L NS in ED.   - C/w ceftriaxone (2/26 end) and azithromycin (2/24 end).   - bcx- neg    -urine culture ( UA was negative)   - mycoplasma, strep, legionella, MRSA (all neg).  - F/U sputum cx. - P/w cough, SOB, fevers, chills.   - Met sepsis criteria (leukocytosis, febrile. No tachypnea and tachycardia)  - Hypoxic on RA. Requiring 2L NC.   - RVP negative.   - CXR -  as above   - S/p cefepime and vanc in ED.   - S/p 1L NS in ED.   - C/w ceftriaxone (2/26 end) and azithromycin (2/24 end).   - bcx- neg    -urine culture ( UA was negative)   - mycoplasma, strep, legionella, MRSA (all neg).  - F/U sputum cx.  - c/w duoneb and Robitussin as needed.

## 2024-02-24 NOTE — PROGRESS NOTE ADULT - PROBLEM SELECTOR PLAN 4
hx of htn  currently on lasix 40   bp eleavted at 178/91  started on xxxx hx of htn  currently on lasix 40   bp elevated at 178/91  started on xxxx hx of htn  currently on lasix 40   bp elevated at 178/91  started on amlodipine 5 mg  monitor bp

## 2024-02-24 NOTE — PROGRESS NOTE ADULT - ASSESSMENT
Patient is a 87 y/o F who lives alone and walks with a walker w/ PMH of asthma, b/l DVT (20 years ago), hypothyroidism, Factor V Leiden,  who presented with 1 week history of worsening cough and SOB associated with fevers, and chills. CXR is suspicious for RLL consolidation. Patient is being admitted for acute hypoxic respiratory failure and sepsis 2/2 to pneumonia.   seen and examined pt at the bedside Pt is AO x 3 eating regular consistency food well without swallowing difficulty saturating well on O2 2:.min via N-C. Tolerating antibiotics well,  cultures being tested. otherwise, pt is not in distress

## 2024-02-24 NOTE — PROGRESS NOTE ADULT - PROBLEM SELECTOR PLAN 3
- On warfarin for DVT.   - P/w INR of 5.21.   - No signs of active bleed.   - Hold warfarin dose.   -  INR 4.87 - continue to hold warfarin now and daily PT/INT monitoring  - Family and Past medical history of Factor V Leiden   - Added Eliquis, which has a $47, will discuss with pt if she can afford.

## 2024-02-25 LAB
ALBUMIN SERPL ELPH-MCNC: 2.5 G/DL — LOW (ref 3.5–5)
ALP SERPL-CCNC: 99 U/L — SIGNIFICANT CHANGE UP (ref 40–120)
ALT FLD-CCNC: 42 U/L DA — SIGNIFICANT CHANGE UP (ref 10–60)
ANION GAP SERPL CALC-SCNC: 4 MMOL/L — LOW (ref 5–17)
APTT BLD: 28.5 SEC — SIGNIFICANT CHANGE UP (ref 24.5–35.6)
AST SERPL-CCNC: 32 U/L — SIGNIFICANT CHANGE UP (ref 10–40)
BILIRUB SERPL-MCNC: 0.4 MG/DL — SIGNIFICANT CHANGE UP (ref 0.2–1.2)
BUN SERPL-MCNC: 11 MG/DL — SIGNIFICANT CHANGE UP (ref 7–18)
CALCIUM SERPL-MCNC: 9 MG/DL — SIGNIFICANT CHANGE UP (ref 8.4–10.5)
CHLORIDE SERPL-SCNC: 101 MMOL/L — SIGNIFICANT CHANGE UP (ref 96–108)
CO2 SERPL-SCNC: 31 MMOL/L — SIGNIFICANT CHANGE UP (ref 22–31)
CREAT SERPL-MCNC: 0.7 MG/DL — SIGNIFICANT CHANGE UP (ref 0.5–1.3)
EGFR: 84 ML/MIN/1.73M2 — SIGNIFICANT CHANGE UP
GLUCOSE SERPL-MCNC: 120 MG/DL — HIGH (ref 70–99)
HCT VFR BLD CALC: 36.8 % — SIGNIFICANT CHANGE UP (ref 34.5–45)
HGB BLD-MCNC: 12.4 G/DL — SIGNIFICANT CHANGE UP (ref 11.5–15.5)
INR BLD: 1.74 RATIO — HIGH (ref 0.85–1.18)
MAGNESIUM SERPL-MCNC: 1.9 MG/DL — SIGNIFICANT CHANGE UP (ref 1.6–2.6)
MCHC RBC-ENTMCNC: 32.3 PG — SIGNIFICANT CHANGE UP (ref 27–34)
MCHC RBC-ENTMCNC: 33.7 GM/DL — SIGNIFICANT CHANGE UP (ref 32–36)
MCV RBC AUTO: 95.8 FL — SIGNIFICANT CHANGE UP (ref 80–100)
NRBC # BLD: 0 /100 WBCS — SIGNIFICANT CHANGE UP (ref 0–0)
PHOSPHATE SERPL-MCNC: 3.2 MG/DL — SIGNIFICANT CHANGE UP (ref 2.5–4.5)
PLATELET # BLD AUTO: 261 K/UL — SIGNIFICANT CHANGE UP (ref 150–400)
POTASSIUM SERPL-MCNC: 3.6 MMOL/L — SIGNIFICANT CHANGE UP (ref 3.5–5.3)
POTASSIUM SERPL-SCNC: 3.6 MMOL/L — SIGNIFICANT CHANGE UP (ref 3.5–5.3)
PROT SERPL-MCNC: 6.6 G/DL — SIGNIFICANT CHANGE UP (ref 6–8.3)
PROTHROM AB SERPL-ACNC: 19.5 SEC — HIGH (ref 9.5–13)
RBC # BLD: 3.84 M/UL — SIGNIFICANT CHANGE UP (ref 3.8–5.2)
RBC # FLD: 12.8 % — SIGNIFICANT CHANGE UP (ref 10.3–14.5)
SODIUM SERPL-SCNC: 136 MMOL/L — SIGNIFICANT CHANGE UP (ref 135–145)
WBC # BLD: 8.19 K/UL — SIGNIFICANT CHANGE UP (ref 3.8–10.5)
WBC # FLD AUTO: 8.19 K/UL — SIGNIFICANT CHANGE UP (ref 3.8–10.5)

## 2024-02-25 RX ORDER — LOSARTAN POTASSIUM 100 MG/1
25 TABLET, FILM COATED ORAL DAILY
Refills: 0 | Status: DISCONTINUED | OUTPATIENT
Start: 2024-02-25 | End: 2024-02-26

## 2024-02-25 RX ORDER — APIXABAN 2.5 MG/1
5 TABLET, FILM COATED ORAL
Refills: 0 | Status: DISCONTINUED | OUTPATIENT
Start: 2024-02-25 | End: 2024-02-27

## 2024-02-25 RX ADMIN — ESCITALOPRAM OXALATE 10 MILLIGRAM(S): 10 TABLET, FILM COATED ORAL at 11:35

## 2024-02-25 RX ADMIN — Medication 100 MILLIGRAM(S): at 11:35

## 2024-02-25 RX ADMIN — Medication 3 MILLILITER(S): at 08:43

## 2024-02-25 RX ADMIN — APIXABAN 5 MILLIGRAM(S): 2.5 TABLET, FILM COATED ORAL at 17:44

## 2024-02-25 RX ADMIN — Medication 600 MILLIGRAM(S): at 05:27

## 2024-02-25 RX ADMIN — Medication 600 MILLIGRAM(S): at 17:44

## 2024-02-25 RX ADMIN — Medication 75 MICROGRAM(S): at 05:25

## 2024-02-25 RX ADMIN — Medication 100 MILLIGRAM(S): at 04:38

## 2024-02-25 RX ADMIN — AMLODIPINE BESYLATE 5 MILLIGRAM(S): 2.5 TABLET ORAL at 05:26

## 2024-02-25 RX ADMIN — Medication 1 SPRAY(S): at 17:45

## 2024-02-25 RX ADMIN — Medication 3 MILLILITER(S): at 14:55

## 2024-02-25 RX ADMIN — SODIUM CHLORIDE 4 MILLILITER(S): 9 INJECTION INTRAMUSCULAR; INTRAVENOUS; SUBCUTANEOUS at 08:43

## 2024-02-25 RX ADMIN — Medication 40 MILLIGRAM(S): at 05:26

## 2024-02-25 RX ADMIN — Medication 0.5 MILLIGRAM(S): at 21:53

## 2024-02-25 RX ADMIN — LOSARTAN POTASSIUM 25 MILLIGRAM(S): 100 TABLET, FILM COATED ORAL at 11:35

## 2024-02-25 RX ADMIN — CEFTRIAXONE 100 MILLIGRAM(S): 500 INJECTION, POWDER, FOR SOLUTION INTRAMUSCULAR; INTRAVENOUS at 05:24

## 2024-02-25 RX ADMIN — Medication 3 MILLILITER(S): at 20:20

## 2024-02-25 RX ADMIN — ATORVASTATIN CALCIUM 40 MILLIGRAM(S): 80 TABLET, FILM COATED ORAL at 21:48

## 2024-02-25 RX ADMIN — Medication 1 SPRAY(S): at 05:26

## 2024-02-25 RX ADMIN — Medication 100 MILLIGRAM(S): at 21:53

## 2024-02-25 NOTE — DIETITIAN INITIAL EVALUATION ADULT - OTHER INFO
Pt lives alone at home, on Venti Mask when visited, weak, asleep when revisited again later, forgetful at times per chart; Limited intake/wt change history data available at present; varied intake depending on food and how pt feels, 25% breakfast intake observed today, 51-75%, % at times per Flowsheets; Unknown food allergies per Chart; Pending discharge planning to skilled nursing facility for rehab when medically ready per team

## 2024-02-25 NOTE — DIETITIAN INITIAL EVALUATION ADULT - PERTINENT LABORATORY DATA
02-25    136  |  101  |  11  ----------------------------<  120<H>  3.6   |  31  |  0.70    Ca    9.0      25 Feb 2024 07:22  Phos  3.2     02-25  Mg     1.9     02-25    TPro  6.6  /  Alb  2.5<L>  /  TBili  0.4  /  DBili  x   /  AST  32  /  ALT  42  /  AlkPhos  99  02-25

## 2024-02-25 NOTE — DIETITIAN INITIAL EVALUATION ADULT - FACTORS AFF FOOD INTAKE
advanced age with acute on chronic comorbidities including sepsis, AHRF/Zoroastrian/ethnic/cultural/personal food preferences advanced age with acute on chronic comorbidities including sepsis, acute hypoxic respiratory failure/Mandaen/ethnic/cultural/personal food preferences

## 2024-02-25 NOTE — DIETITIAN INITIAL EVALUATION ADULT - ETIOLOGY
advanced age with acute on chronic comorbidities including sepsis, AHRF; cultural/ethnical/individual food preferences

## 2024-02-25 NOTE — DIETITIAN INITIAL EVALUATION ADULT - PERTINENT MEDS FT
MEDICATIONS  (STANDING):  albuterol/ipratropium for Nebulization 3 milliLiter(s) Nebulizer every 6 hours  amLODIPine   Tablet 5 milliGRAM(s) Oral daily  atorvastatin 40 milliGRAM(s) Oral at bedtime  cefTRIAXone   IVPB 1000 milliGRAM(s) IV Intermittent every 24 hours  escitalopram 10 milliGRAM(s) Oral daily  fluticasone propionate 50 MICROgram(s)/spray Nasal Spray 1 Spray(s) Both Nostrils two times a day  furosemide    Tablet 40 milliGRAM(s) Oral daily  guaiFENesin  milliGRAM(s) Oral every 12 hours  levothyroxine 75 MICROGram(s) Oral daily    MEDICATIONS  (PRN):  acetaminophen     Tablet .. 650 milliGRAM(s) Oral every 6 hours PRN Temp greater or equal to 38C (100.4F), Mild Pain (1 - 3)  ALPRAZolam 0.5 milliGRAM(s) Oral at bedtime PRN insomnia/anxiety  guaiFENesin Oral Liquid (Sugar-Free) 100 milliGRAM(s) Oral every 6 hours PRN Cough  ondansetron Injectable 4 milliGRAM(s) IV Push every 8 hours PRN Nausea and/or Vomiting

## 2024-02-25 NOTE — DIETITIAN INITIAL EVALUATION ADULT - EDUCATION DIETARY MODIFICATIONS
Hpi Title: Evaluation of Skin Lesions
How Severe Are Your Spot(S)?: mild
Have Your Spot(S) Been Treated In The Past?: has not been treated
at present/not applicable

## 2024-02-25 NOTE — DIETITIAN INITIAL EVALUATION ADULT - PROBLEM SELECTOR PROBLEM 4
Patient on RA. Sats 97%. Pt. in no distress, will continue to monitor.     
Pt prefers to have daughter Virginia present for discharge.  
DVT, lower extremity

## 2024-02-26 LAB
ALBUMIN SERPL ELPH-MCNC: 2.4 G/DL — LOW (ref 3.5–5)
ALP SERPL-CCNC: 93 U/L — SIGNIFICANT CHANGE UP (ref 40–120)
ALT FLD-CCNC: 42 U/L DA — SIGNIFICANT CHANGE UP (ref 10–60)
ANION GAP SERPL CALC-SCNC: 7 MMOL/L — SIGNIFICANT CHANGE UP (ref 5–17)
AST SERPL-CCNC: 33 U/L — SIGNIFICANT CHANGE UP (ref 10–40)
BILIRUB SERPL-MCNC: 0.5 MG/DL — SIGNIFICANT CHANGE UP (ref 0.2–1.2)
BUN SERPL-MCNC: 16 MG/DL — SIGNIFICANT CHANGE UP (ref 7–18)
CALCIUM SERPL-MCNC: 9.2 MG/DL — SIGNIFICANT CHANGE UP (ref 8.4–10.5)
CHLORIDE SERPL-SCNC: 102 MMOL/L — SIGNIFICANT CHANGE UP (ref 96–108)
CO2 SERPL-SCNC: 32 MMOL/L — HIGH (ref 22–31)
CREAT SERPL-MCNC: 0.9 MG/DL — SIGNIFICANT CHANGE UP (ref 0.5–1.3)
CULTURE RESULTS: SIGNIFICANT CHANGE UP
CULTURE RESULTS: SIGNIFICANT CHANGE UP
EGFR: 62 ML/MIN/1.73M2 — SIGNIFICANT CHANGE UP
GLUCOSE SERPL-MCNC: 114 MG/DL — HIGH (ref 70–99)
HCT VFR BLD CALC: 37.1 % — SIGNIFICANT CHANGE UP (ref 34.5–45)
HGB BLD-MCNC: 12.2 G/DL — SIGNIFICANT CHANGE UP (ref 11.5–15.5)
MAGNESIUM SERPL-MCNC: 2.1 MG/DL — SIGNIFICANT CHANGE UP (ref 1.6–2.6)
MCHC RBC-ENTMCNC: 32.4 PG — SIGNIFICANT CHANGE UP (ref 27–34)
MCHC RBC-ENTMCNC: 32.9 GM/DL — SIGNIFICANT CHANGE UP (ref 32–36)
MCV RBC AUTO: 98.7 FL — SIGNIFICANT CHANGE UP (ref 80–100)
NRBC # BLD: 0 /100 WBCS — SIGNIFICANT CHANGE UP (ref 0–0)
PHOSPHATE SERPL-MCNC: 3.5 MG/DL — SIGNIFICANT CHANGE UP (ref 2.5–4.5)
PLATELET # BLD AUTO: 304 K/UL — SIGNIFICANT CHANGE UP (ref 150–400)
POTASSIUM SERPL-MCNC: 3.8 MMOL/L — SIGNIFICANT CHANGE UP (ref 3.5–5.3)
POTASSIUM SERPL-SCNC: 3.8 MMOL/L — SIGNIFICANT CHANGE UP (ref 3.5–5.3)
PROT SERPL-MCNC: 6.8 G/DL — SIGNIFICANT CHANGE UP (ref 6–8.3)
RBC # BLD: 3.76 M/UL — LOW (ref 3.8–5.2)
RBC # FLD: 13.2 % — SIGNIFICANT CHANGE UP (ref 10.3–14.5)
SODIUM SERPL-SCNC: 141 MMOL/L — SIGNIFICANT CHANGE UP (ref 135–145)
SPECIMEN SOURCE: SIGNIFICANT CHANGE UP
SPECIMEN SOURCE: SIGNIFICANT CHANGE UP
WBC # BLD: 8.63 K/UL — SIGNIFICANT CHANGE UP (ref 3.8–10.5)
WBC # FLD AUTO: 8.63 K/UL — SIGNIFICANT CHANGE UP (ref 3.8–10.5)

## 2024-02-26 PROCEDURE — 99232 SBSQ HOSP IP/OBS MODERATE 35: CPT | Mod: GC

## 2024-02-26 RX ORDER — AMLODIPINE BESYLATE 2.5 MG/1
5 TABLET ORAL DAILY
Refills: 0 | Status: DISCONTINUED | OUTPATIENT
Start: 2024-02-27 | End: 2024-02-27

## 2024-02-26 RX ADMIN — Medication 3 MILLILITER(S): at 08:02

## 2024-02-26 RX ADMIN — Medication 100 MILLIGRAM(S): at 21:49

## 2024-02-26 RX ADMIN — LOSARTAN POTASSIUM 25 MILLIGRAM(S): 100 TABLET, FILM COATED ORAL at 05:51

## 2024-02-26 RX ADMIN — Medication 1 SPRAY(S): at 05:56

## 2024-02-26 RX ADMIN — Medication 1 SPRAY(S): at 17:38

## 2024-02-26 RX ADMIN — Medication 75 MICROGRAM(S): at 05:51

## 2024-02-26 RX ADMIN — ATORVASTATIN CALCIUM 40 MILLIGRAM(S): 80 TABLET, FILM COATED ORAL at 21:49

## 2024-02-26 RX ADMIN — Medication 600 MILLIGRAM(S): at 17:37

## 2024-02-26 RX ADMIN — Medication 3 MILLILITER(S): at 20:24

## 2024-02-26 RX ADMIN — Medication 0.5 MILLIGRAM(S): at 21:49

## 2024-02-26 RX ADMIN — Medication 3 MILLILITER(S): at 14:30

## 2024-02-26 RX ADMIN — Medication 40 MILLIGRAM(S): at 05:50

## 2024-02-26 RX ADMIN — APIXABAN 5 MILLIGRAM(S): 2.5 TABLET, FILM COATED ORAL at 05:51

## 2024-02-26 RX ADMIN — Medication 600 MILLIGRAM(S): at 05:51

## 2024-02-26 RX ADMIN — ESCITALOPRAM OXALATE 10 MILLIGRAM(S): 10 TABLET, FILM COATED ORAL at 11:34

## 2024-02-26 RX ADMIN — APIXABAN 5 MILLIGRAM(S): 2.5 TABLET, FILM COATED ORAL at 17:37

## 2024-02-26 RX ADMIN — AMLODIPINE BESYLATE 5 MILLIGRAM(S): 2.5 TABLET ORAL at 05:50

## 2024-02-26 RX ADMIN — CEFTRIAXONE 100 MILLIGRAM(S): 500 INJECTION, POWDER, FOR SOLUTION INTRAMUSCULAR; INTRAVENOUS at 05:50

## 2024-02-26 NOTE — PROGRESS NOTE ADULT - PROBLEM SELECTOR PLAN 8
pending blood and urine cultures  PT rosalinda NAJERA  titrate down O2 as tolerated  spoke to granddaughter via phone and all questions answered  - discuss co-pay for Eliquis  [facility] patient - will consult pulmonology for s/p treatment of IP PNA in elderly patient  - Dr. Hicks consulted   - planned for d/s in AM
pending blood and urine cultures  PT rosalinda NAJERA  titrate down O2 as tolerated  spoke to granddaughter via phone and all questions answered  - discuss co-pay for Eliquis

## 2024-02-26 NOTE — PROGRESS NOTE ADULT - PROBLEM SELECTOR PLAN 6
C/w levothyroxine.
- H/O DVT.   - Hold AC in setting of supratherapeutic INR.
- H/O DVT.   - Hold AC in setting of supratherapeutic INR.
C/w levothyroxine.

## 2024-02-26 NOTE — PROGRESS NOTE ADULT - PROBLEM SELECTOR PLAN 1
- P/w cough, SOB, fevers, chills.   - Met sepsis criteria (leukocytosis, febrile. No tachypnea and tachycardia)  - Hypoxic on RA. Requiring 2L NC.   - RVP negative.   - CXR -  as above   - S/p cefepime and vanc in ED.   - S/p 1L NS in ED.   - s/p  ceftriaxone (2/26 end) and azithromycin (2/24 end).   - bcx- neg    -urine culture ( UA was negative)   - mycoplasma, strep, legionella, MRSA (all neg).  - F/U sputum cx.  - c/w duoneb and Robitussin as needed.  - wean off O2 NC to RA as tolerated

## 2024-02-26 NOTE — PROGRESS NOTE ADULT - PROBLEM SELECTOR PLAN 4
hx of htn  currently on lasix 40   bp elevated at 178/91  started on amlodipine 5 mg  monitor bp except right knee limited due to pain./bilateral upper extremity ROM was WNL (within normal limits)/bilateral lower extremity was ROM was WNL (within normal limits)/deficits as listed below

## 2024-02-26 NOTE — PROGRESS NOTE ADULT - PROBLEM SELECTOR PLAN 3
- On warfarin for DVT.   - P/w INR of 5.21.   - No signs of active bleed.   - Hold warfarin dose.   -  INR 4.87 - continue to hold warfarin now and daily PT/INT monitoring  - Family and Past medical history of Factor V Leiden   - started Eliquis today, to be discharged on the same

## 2024-02-26 NOTE — PROGRESS NOTE ADULT - PROBLEM SELECTOR PLAN 5
On warfarin.   - Hold in setting of supratherapeutic INR.  - see above
discontinued warfarin   - Hold in setting of supratherapeutic INR.  - see above  - started on eliquis
C/w levothyroxine.
C/w levothyroxine.

## 2024-02-26 NOTE — PROGRESS NOTE ADULT - SUBJECTIVE AND OBJECTIVE BOX
PGY-1 Progress Note discussed with attending    PAGER #: [[phone]] TILL 5:00 PM  PLEASE CONTACT ON CALL TEAM:  - On Call Team (Please refer to Sindi) FROM 5:00 PM - 8:30PM  - Nightfloat Team FROM 8:30 -7:30 AM    CHIEF COMPLAINT & BRIEF HOSPITAL COURSE:      INTERVAL HPI/OVERNIGHT EVENTS: Patient has intermittent SOB, which attributes to asthma   Denies cough/ increased fatigue   Patient's daughter ( Ms Alva ) informed that patient will not be discharged today       REVIEW OF SYSTEMS:  CONSTITUTIONAL: No fever, weight loss, or fatigue  RESPIRATORY: No cough, wheezing, chills or hemoptysis; + intermittent SOB  CARDIOVASCULAR: No chest pain, palpitations, dizziness, or leg swelling  GASTROINTESTINAL: No abdominal pain. No nausea, vomiting, or hematemesis; No diarrhea or constipation. No melena or hematochezia.  GENITOURINARY: No dysuria or hematuria, urinary frequency  NEUROLOGICAL: No headaches, memory loss, loss of strength, numbness, or tremors  SKIN: No itching, burning, rashes, or lesions     MEDICATIONS  (STANDING):  albuterol/ipratropium for Nebulization 3 milliLiter(s) Nebulizer every 6 hours  apixaban 5 milliGRAM(s) Oral two times a day  atorvastatin 40 milliGRAM(s) Oral at bedtime  escitalopram 10 milliGRAM(s) Oral daily  fluticasone propionate 50 MICROgram(s)/spray Nasal Spray 1 Spray(s) Both Nostrils two times a day  furosemide    Tablet 40 milliGRAM(s) Oral daily  guaiFENesin  milliGRAM(s) Oral every 12 hours  levothyroxine 75 MICROGram(s) Oral daily    MEDICATIONS  (PRN):  acetaminophen     Tablet .. 650 milliGRAM(s) Oral every 6 hours PRN Temp greater or equal to 38C (100.4F), Mild Pain (1 - 3)  ALPRAZolam 0.5 milliGRAM(s) Oral at bedtime PRN insomnia/anxiety  guaiFENesin Oral Liquid (Sugar-Free) 100 milliGRAM(s) Oral every 6 hours PRN Cough  ondansetron Injectable 4 milliGRAM(s) IV Push every 8 hours PRN Nausea and/or Vomiting      Vital Signs Last 24 Hrs  T(C): 36.7 (26 Feb 2024 14:26), Max: 37.4 (25 Feb 2024 20:29)  T(F): 98 (26 Feb 2024 14:26), Max: 99.3 (25 Feb 2024 20:29)  HR: 98 (26 Feb 2024 14:26) (88 - 98)  BP: 96/59 (26 Feb 2024 14:26) (96/59 - 143/81)  BP(mean): 102 (26 Feb 2024 04:40) (102 - 102)  RR: 20 (26 Feb 2024 14:26) (19 - 20)  SpO2: 96% (26 Feb 2024 14:26) (93% - 96%)    Parameters below as of 26 Feb 2024 14:26  Patient On (Oxygen Delivery Method): nasal cannula  O2 Flow (L/min): 2      PHYSICAL EXAMINATION:  GENERAL: NAD, well built  HEAD:  Atraumatic, Normocephalic  EYES:  conjunctiva and sclera clear  NECK: Supple, No JVD, Normal thyroid  CHEST/LUNG: mid   HE: Regular rate and rhythm; No murmurs, rubs, or gallops  ABDOMEN: Soft, Nontender, Nondistended; Bowel sounds present, no pain or masses on palpation  NERVOUS SYSTEM:  Alert & Oriented X3, +resting tremors BL UE   : voiding well  EXTREMITIES:  2+ Peripheral Pulses, No clubbing, cyanosis, or edema  SKIN: warm dry                          12.2   8.63  )-----------( 304      ( 26 Feb 2024 08:20 )             37.1     02-26    141  |  102  |  16  ----------------------------<  114<H>  3.8   |  32<H>  |  0.90    Ca    9.2      26 Feb 2024 08:20  Phos  3.5     02-26  Mg     2.1     02-26    TPro  6.8  /  Alb  2.4<L>  /  TBili  0.5  /  DBili  x   /  AST  33  /  ALT  42  /  AlkPhos  93  02-26    LIVER FUNCTIONS - ( 26 Feb 2024 08:20 )  Alb: 2.4 g/dL / Pro: 6.8 g/dL / ALK PHOS: 93 U/L / ALT: 42 U/L DA / AST: 33 U/L / GGT: x               PT/INR - ( 25 Feb 2024 07:22 )   PT: 19.5 sec;   INR: 1.74 ratio         PTT - ( 25 Feb 2024 07:22 )  PTT:28.5 sec    I&O's Summary          CAPILLARY BLOOD GLUCOSE      RADIOLOGY & ADDITIONAL TESTS:                   PGY-1 Progress Note discussed with attending    PAGER #: [[phone]] TILL 5:00 PM  PLEASE CONTACT ON CALL TEAM:  - On Call Team (Please refer to Sindi) FROM 5:00 PM - 8:30PM  - Nightfloat Team FROM 8:30 -7:30 AM    CHIEF COMPLAINT & BRIEF HOSPITAL COURSE:      INTERVAL HPI/OVERNIGHT EVENTS: Patient has intermittent SOB, which attributes to asthma   Denies cough/ increased fatigue   Patient's daughter ( Ms Alva ) informed that patient will not be discharged today       REVIEW OF SYSTEMS:  CONSTITUTIONAL: No fever, weight loss, or fatigue  RESPIRATORY: No cough, wheezing, chills or hemoptysis; + intermittent SOB  CARDIOVASCULAR: No chest pain, palpitations, dizziness, or leg swelling  GASTROINTESTINAL: No abdominal pain. No nausea, vomiting, or hematemesis; No diarrhea or constipation. No melena or hematochezia.  GENITOURINARY: No dysuria or hematuria, urinary frequency  NEUROLOGICAL: No headaches, memory loss, loss of strength, numbness, or tremors  SKIN: No itching, burning, rashes, or lesions     MEDICATIONS  (STANDING):  albuterol/ipratropium for Nebulization 3 milliLiter(s) Nebulizer every 6 hours  apixaban 5 milliGRAM(s) Oral two times a day  atorvastatin 40 milliGRAM(s) Oral at bedtime  escitalopram 10 milliGRAM(s) Oral daily  fluticasone propionate 50 MICROgram(s)/spray Nasal Spray 1 Spray(s) Both Nostrils two times a day  furosemide    Tablet 40 milliGRAM(s) Oral daily  guaiFENesin  milliGRAM(s) Oral every 12 hours  levothyroxine 75 MICROGram(s) Oral daily    MEDICATIONS  (PRN):  acetaminophen     Tablet .. 650 milliGRAM(s) Oral every 6 hours PRN Temp greater or equal to 38C (100.4F), Mild Pain (1 - 3)  ALPRAZolam 0.5 milliGRAM(s) Oral at bedtime PRN insomnia/anxiety  guaiFENesin Oral Liquid (Sugar-Free) 100 milliGRAM(s) Oral every 6 hours PRN Cough  ondansetron Injectable 4 milliGRAM(s) IV Push every 8 hours PRN Nausea and/or Vomiting      Vital Signs Last 24 Hrs  T(C): 36.7 (26 Feb 2024 14:26), Max: 37.4 (25 Feb 2024 20:29)  T(F): 98 (26 Feb 2024 14:26), Max: 99.3 (25 Feb 2024 20:29)  HR: 98 (26 Feb 2024 14:26) (88 - 98)  BP: 96/59 (26 Feb 2024 14:26) (96/59 - 143/81)  BP(mean): 102 (26 Feb 2024 04:40) (102 - 102)  RR: 20 (26 Feb 2024 14:26) (19 - 20)  SpO2: 96% (26 Feb 2024 14:26) (93% - 96%)    Parameters below as of 26 Feb 2024 14:26  Patient On (Oxygen Delivery Method): nasal cannula  O2 Flow (L/min): 2      PHYSICAL EXAMINATION:  GENERAL: NAD, well built  HEAD:  Atraumatic, Normocephalic  EYES:  conjunctiva and sclera clear  NECK: Supple, No JVD, Normal thyroid  CHEST/LUNG: mild diffuse intermittent wheezing, saturating 99 % ON 2 L NC   HEART: Regular rate and rhythm; No murmurs, rubs, or gallops  ABDOMEN: Soft, Nontender, Nondistended; Bowel sounds present, no pain or masses on palpation  NERVOUS SYSTEM:  Alert & Oriented X2-3 , +resting tremors BL UE   : voiding well  EXTREMITIES:  2+ Peripheral Pulses, No clubbing, cyanosis, or edema  SKIN: BL LE chronic skin changes, severe tenderness to light touch                           12.2   8.63  )-----------( 304      ( 26 Feb 2024 08:20 )             37.1     02-26    141  |  102  |  16  ----------------------------<  114<H>  3.8   |  32<H>  |  0.90    Ca    9.2      26 Feb 2024 08:20  Phos  3.5     02-26  Mg     2.1     02-26    TPro  6.8  /  Alb  2.4<L>  /  TBili  0.5  /  DBili  x   /  AST  33  /  ALT  42  /  AlkPhos  93  02-26    LIVER FUNCTIONS - ( 26 Feb 2024 08:20 )  Alb: 2.4 g/dL / Pro: 6.8 g/dL / ALK PHOS: 93 U/L / ALT: 42 U/L DA / AST: 33 U/L / GGT: x               PT/INR - ( 25 Feb 2024 07:22 )   PT: 19.5 sec;   INR: 1.74 ratio         PTT - ( 25 Feb 2024 07:22 )  PTT:28.5 sec    I&O's Summary          CAPILLARY BLOOD GLUCOSE      RADIOLOGY & ADDITIONAL TESTS:

## 2024-02-26 NOTE — PROGRESS NOTE ADULT - ASSESSMENT
Patient is a 89 y/o F who lives alone and walks with a walker w/ PMH of asthma, b/l DVT (20 years ago), hypothyroidism, Factor V Leiden,  who presented with 1 week history of worsening cough and SOB associated with fevers, and chills. CXR is suspicious for RLL consolidation. Patient is being admitted for acute hypoxic respiratory failure and sepsis 2/2 to pneumonia.   seen and examined pt at the bedside Pt is AO x 3 eating regular consistency food well without swallowing difficulty saturating well on O2 2:.min via N-C. Tolerating antibiotics well,  cultures being tested. otherwise, pt is not in distress. Discharge planning initiated

## 2024-02-27 ENCOUNTER — TRANSCRIPTION ENCOUNTER (OUTPATIENT)
Age: 89
End: 2024-02-27

## 2024-02-27 VITALS — OXYGEN SATURATION: 96 % | DIASTOLIC BLOOD PRESSURE: 81 MMHG | SYSTOLIC BLOOD PRESSURE: 137 MMHG | HEART RATE: 92 BPM

## 2024-02-27 LAB
ANION GAP SERPL CALC-SCNC: 6 MMOL/L — SIGNIFICANT CHANGE UP (ref 5–17)
BUN SERPL-MCNC: 19 MG/DL — HIGH (ref 7–18)
CALCIUM SERPL-MCNC: 9 MG/DL — SIGNIFICANT CHANGE UP (ref 8.4–10.5)
CHLORIDE SERPL-SCNC: 102 MMOL/L — SIGNIFICANT CHANGE UP (ref 96–108)
CO2 SERPL-SCNC: 31 MMOL/L — SIGNIFICANT CHANGE UP (ref 22–31)
CREAT SERPL-MCNC: 0.92 MG/DL — SIGNIFICANT CHANGE UP (ref 0.5–1.3)
EGFR: 60 ML/MIN/1.73M2 — SIGNIFICANT CHANGE UP
GLUCOSE SERPL-MCNC: 114 MG/DL — HIGH (ref 70–99)
HCT VFR BLD CALC: 36.3 % — SIGNIFICANT CHANGE UP (ref 34.5–45)
HGB BLD-MCNC: 11.9 G/DL — SIGNIFICANT CHANGE UP (ref 11.5–15.5)
MAGNESIUM SERPL-MCNC: 2 MG/DL — SIGNIFICANT CHANGE UP (ref 1.6–2.6)
MCHC RBC-ENTMCNC: 32.8 GM/DL — SIGNIFICANT CHANGE UP (ref 32–36)
MCHC RBC-ENTMCNC: 33.1 PG — SIGNIFICANT CHANGE UP (ref 27–34)
MCV RBC AUTO: 101.1 FL — HIGH (ref 80–100)
NRBC # BLD: 0 /100 WBCS — SIGNIFICANT CHANGE UP (ref 0–0)
PHOSPHATE SERPL-MCNC: 3 MG/DL — SIGNIFICANT CHANGE UP (ref 2.5–4.5)
PLATELET # BLD AUTO: 311 K/UL — SIGNIFICANT CHANGE UP (ref 150–400)
POTASSIUM SERPL-MCNC: 3.4 MMOL/L — LOW (ref 3.5–5.3)
POTASSIUM SERPL-SCNC: 3.4 MMOL/L — LOW (ref 3.5–5.3)
RBC # BLD: 3.59 M/UL — LOW (ref 3.8–5.2)
RBC # FLD: 13.1 % — SIGNIFICANT CHANGE UP (ref 10.3–14.5)
SODIUM SERPL-SCNC: 139 MMOL/L — SIGNIFICANT CHANGE UP (ref 135–145)
WBC # BLD: 6.69 K/UL — SIGNIFICANT CHANGE UP (ref 3.8–10.5)
WBC # FLD AUTO: 6.69 K/UL — SIGNIFICANT CHANGE UP (ref 3.8–10.5)

## 2024-02-27 PROCEDURE — 87086 URINE CULTURE/COLONY COUNT: CPT

## 2024-02-27 PROCEDURE — 99223 1ST HOSP IP/OBS HIGH 75: CPT

## 2024-02-27 PROCEDURE — 85730 THROMBOPLASTIN TIME PARTIAL: CPT

## 2024-02-27 PROCEDURE — 97530 THERAPEUTIC ACTIVITIES: CPT

## 2024-02-27 PROCEDURE — 82746 ASSAY OF FOLIC ACID SERUM: CPT

## 2024-02-27 PROCEDURE — 97110 THERAPEUTIC EXERCISES: CPT

## 2024-02-27 PROCEDURE — 96367 TX/PROPH/DG ADDL SEQ IV INF: CPT

## 2024-02-27 PROCEDURE — 94640 AIRWAY INHALATION TREATMENT: CPT

## 2024-02-27 PROCEDURE — 83690 ASSAY OF LIPASE: CPT

## 2024-02-27 PROCEDURE — 83010 ASSAY OF HAPTOGLOBIN QUANT: CPT

## 2024-02-27 PROCEDURE — 85027 COMPLETE CBC AUTOMATED: CPT

## 2024-02-27 PROCEDURE — 87641 MR-STAPH DNA AMP PROBE: CPT

## 2024-02-27 PROCEDURE — 84484 ASSAY OF TROPONIN QUANT: CPT

## 2024-02-27 PROCEDURE — 87040 BLOOD CULTURE FOR BACTERIA: CPT

## 2024-02-27 PROCEDURE — 83605 ASSAY OF LACTIC ACID: CPT

## 2024-02-27 PROCEDURE — 83735 ASSAY OF MAGNESIUM: CPT

## 2024-02-27 PROCEDURE — 87899 AGENT NOS ASSAY W/OPTIC: CPT

## 2024-02-27 PROCEDURE — 93005 ELECTROCARDIOGRAM TRACING: CPT

## 2024-02-27 PROCEDURE — 71045 X-RAY EXAM CHEST 1 VIEW: CPT

## 2024-02-27 PROCEDURE — 96365 THER/PROPH/DIAG IV INF INIT: CPT

## 2024-02-27 PROCEDURE — 85610 PROTHROMBIN TIME: CPT

## 2024-02-27 PROCEDURE — 83540 ASSAY OF IRON: CPT

## 2024-02-27 PROCEDURE — 83615 LACTATE (LD) (LDH) ENZYME: CPT

## 2024-02-27 PROCEDURE — 80048 BASIC METABOLIC PNL TOTAL CA: CPT

## 2024-02-27 PROCEDURE — 96375 TX/PRO/DX INJ NEW DRUG ADDON: CPT

## 2024-02-27 PROCEDURE — 84295 ASSAY OF SERUM SODIUM: CPT

## 2024-02-27 PROCEDURE — 85025 COMPLETE CBC W/AUTO DIFF WBC: CPT

## 2024-02-27 PROCEDURE — 83880 ASSAY OF NATRIURETIC PEPTIDE: CPT

## 2024-02-27 PROCEDURE — 97116 GAIT TRAINING THERAPY: CPT

## 2024-02-27 PROCEDURE — 82728 ASSAY OF FERRITIN: CPT

## 2024-02-27 PROCEDURE — 86703 HIV-1/HIV-2 1 RESULT ANTBDY: CPT

## 2024-02-27 PROCEDURE — 84132 ASSAY OF SERUM POTASSIUM: CPT

## 2024-02-27 PROCEDURE — 85379 FIBRIN DEGRADATION QUANT: CPT

## 2024-02-27 PROCEDURE — 97162 PT EVAL MOD COMPLEX 30 MIN: CPT

## 2024-02-27 PROCEDURE — 96366 THER/PROPH/DIAG IV INF ADDON: CPT

## 2024-02-27 PROCEDURE — 99239 HOSP IP/OBS DSCHRG MGMT >30: CPT

## 2024-02-27 PROCEDURE — 0225U NFCT DS DNA&RNA 21 SARSCOV2: CPT

## 2024-02-27 PROCEDURE — 99285 EMERGENCY DEPT VISIT HI MDM: CPT | Mod: 25

## 2024-02-27 PROCEDURE — 82962 GLUCOSE BLOOD TEST: CPT

## 2024-02-27 PROCEDURE — 82803 BLOOD GASES ANY COMBINATION: CPT

## 2024-02-27 PROCEDURE — 87640 STAPH A DNA AMP PROBE: CPT

## 2024-02-27 PROCEDURE — 82607 VITAMIN B-12: CPT

## 2024-02-27 PROCEDURE — 83550 IRON BINDING TEST: CPT

## 2024-02-27 PROCEDURE — 87449 NOS EACH ORGANISM AG IA: CPT

## 2024-02-27 PROCEDURE — 96361 HYDRATE IV INFUSION ADD-ON: CPT

## 2024-02-27 PROCEDURE — 86738 MYCOPLASMA ANTIBODY: CPT

## 2024-02-27 PROCEDURE — 84443 ASSAY THYROID STIM HORMONE: CPT

## 2024-02-27 PROCEDURE — 84100 ASSAY OF PHOSPHORUS: CPT

## 2024-02-27 PROCEDURE — 85045 AUTOMATED RETICULOCYTE COUNT: CPT

## 2024-02-27 PROCEDURE — 84145 PROCALCITONIN (PCT): CPT

## 2024-02-27 PROCEDURE — 81003 URINALYSIS AUTO W/O SCOPE: CPT

## 2024-02-27 PROCEDURE — 36415 COLL VENOUS BLD VENIPUNCTURE: CPT

## 2024-02-27 PROCEDURE — 80053 COMPREHEN METABOLIC PANEL: CPT

## 2024-02-27 RX ORDER — FLUTICASONE PROPIONATE 50 MCG
1 SPRAY, SUSPENSION NASAL
Refills: 0 | DISCHARGE

## 2024-02-27 RX ORDER — POTASSIUM CHLORIDE 20 MEQ
40 PACKET (EA) ORAL ONCE
Refills: 0 | Status: COMPLETED | OUTPATIENT
Start: 2024-02-27 | End: 2024-02-27

## 2024-02-27 RX ORDER — AMLODIPINE BESYLATE 2.5 MG/1
1 TABLET ORAL
Qty: 0 | Refills: 0 | DISCHARGE
Start: 2024-02-27

## 2024-02-27 RX ORDER — DEXTROMETHORPHAN POLISTIREX 30 MG/5 ML
10 SUSPENSION, EXTENDED RELEASE 12 HR ORAL
Refills: 0 | DISCHARGE

## 2024-02-27 RX ORDER — ESCITALOPRAM OXALATE 10 MG/1
1 TABLET, FILM COATED ORAL
Refills: 0 | DISCHARGE

## 2024-02-27 RX ORDER — FUROSEMIDE 40 MG
1 TABLET ORAL
Refills: 0 | DISCHARGE

## 2024-02-27 RX ORDER — ALPRAZOLAM 0.25 MG
1 TABLET ORAL
Refills: 0 | DISCHARGE

## 2024-02-27 RX ORDER — ROSUVASTATIN CALCIUM 5 MG/1
1 TABLET ORAL
Refills: 0 | DISCHARGE

## 2024-02-27 RX ORDER — WARFARIN SODIUM 2.5 MG/1
1 TABLET ORAL
Refills: 0 | DISCHARGE

## 2024-02-27 RX ORDER — LEVOTHYROXINE SODIUM 125 MCG
1 TABLET ORAL
Refills: 0 | DISCHARGE

## 2024-02-27 RX ADMIN — ESCITALOPRAM OXALATE 10 MILLIGRAM(S): 10 TABLET, FILM COATED ORAL at 12:26

## 2024-02-27 RX ADMIN — APIXABAN 5 MILLIGRAM(S): 2.5 TABLET, FILM COATED ORAL at 05:29

## 2024-02-27 RX ADMIN — Medication 1 SPRAY(S): at 05:29

## 2024-02-27 RX ADMIN — Medication 75 MICROGRAM(S): at 05:29

## 2024-02-27 RX ADMIN — Medication 100 MILLIGRAM(S): at 05:28

## 2024-02-27 RX ADMIN — Medication 40 MILLIGRAM(S): at 05:29

## 2024-02-27 RX ADMIN — AMLODIPINE BESYLATE 5 MILLIGRAM(S): 2.5 TABLET ORAL at 05:29

## 2024-02-27 RX ADMIN — Medication 40 MILLIEQUIVALENT(S): at 12:26

## 2024-02-27 RX ADMIN — Medication 600 MILLIGRAM(S): at 05:29

## 2024-02-27 RX ADMIN — Medication 3 MILLILITER(S): at 08:19

## 2024-02-27 RX ADMIN — Medication 3 MILLILITER(S): at 03:13

## 2024-02-27 NOTE — CONSULT NOTE ADULT - SUBJECTIVE AND OBJECTIVE BOX
PULMONARY SERVICE INITIAL CONSULT NOTE    HPI:  Patient is a 89 y/o F who lives alone and walks with a walker w/ PMH of asthma, b/l DVT (20 years ago), hypothyroidism, who presented with 1 week history of worsening cough and SOB. Patient reports that she started developed a cough w/ sputum 7 days ago which has since worsened. Her cough is associated with worsening SOB, subjective fevers, chills, severe fatigue, body aches and weakness. Patient reports she is having difficulty ambulation because of weakness. Patient denies any recent headache, dizziness, lightheadedness, chest pain, palpitations, nausea, vomiting, abdominal pain, diarrhea, constipation, melena, hematochezia, dysuria, urinary frequency, or urgency. Patient denies any other complains at this time.      PULMONARY HPI:        REVIEW OF SYSTEMS:  Constitutional: No fever, weight loss or fatigue  Eyes: No eye pain, visual disturbances, or discharge  ENMT:  No difficulty hearing, tinnitus, vertigo; No sinus or throat pain  Neck: No pain, stiffness or neck swelling  Respiratory: see HPI  Cardiovascular: No chest pain, palpitations, dizziness or leg swelling  Gastrointestinal: No abdominal or epigastric pain. No nausea, vomiting or hematemesis; No diarrhea or constipation. No melena or hematochezia.  Genitourinary: No dysuria, frequency, hematuria or incontinence  Neurological: No headaches, memory loss, loss of strength, numbness or tremors  Skin: No itching, burning, rashes or lesions   Lymph Nodes: No enlarged glands  Endocrine: No heat or cold intolerance; No hair loss  Musculoskeletal: No joint pain or swelling; No muscle, back or extremity pain  Psychiatric: No depression, anxiety, mood swings or difficulty sleeping  Heme/Lymph: No easy bruising or bleeding gums  Allergy and Immunologic: No hives or eczema    PAST MEDICAL & SURGICAL HISTORY:  Asthma      Deep vein thrombosis (DVT)      Hyperlipidemia      Hypothyroidism      S/P hip replacement, right      H/O  section          FAMILY HISTORY:      SOCIAL HISTORY:  Smoking Status: [ ] Current, [ ] Former, [ ] Never  Pack Years:    MEDICATIONS:  Pulmonary:  albuterol/ipratropium for Nebulization 3 milliLiter(s) Nebulizer every 6 hours  guaiFENesin  milliGRAM(s) Oral every 12 hours  guaiFENesin Oral Liquid (Sugar-Free) 100 milliGRAM(s) Oral every 6 hours PRN    Antimicrobials:    Anticoagulants:  apixaban 5 milliGRAM(s) Oral two times a day    Onc:    GI/:    Endocrine:  atorvastatin 40 milliGRAM(s) Oral at bedtime  levothyroxine 75 MICROGram(s) Oral daily    Cardiac:  amLODIPine   Tablet 5 milliGRAM(s) Oral daily  furosemide    Tablet 40 milliGRAM(s) Oral daily    Other Medications:  acetaminophen     Tablet .. 650 milliGRAM(s) Oral every 6 hours PRN  ALPRAZolam 0.5 milliGRAM(s) Oral at bedtime PRN  escitalopram 10 milliGRAM(s) Oral daily  fluticasone propionate 50 MICROgram(s)/spray Nasal Spray 1 Spray(s) Both Nostrils two times a day  ondansetron Injectable 4 milliGRAM(s) IV Push every 8 hours PRN  potassium chloride    Tablet ER 40 milliEquivalent(s) Oral once      Allergies    penicillin (Rash)    Intolerances        Vital Signs Last 24 Hrs  T(C): 36.7 (2024 05:36), Max: 36.7 (2024 14:26)  T(F): 98.1 (2024 05:36), Max: 98.1 (2024 05:36)  HR: 90 (2024 05:36) (89 - 98)  BP: 154/83 (2024 05:36) (96/59 - 154/83)  BP(mean): --  RR: 20 (2024 05:36) (20 - 20)  SpO2: 92% (2024 05:36) (92% - 96%)    Parameters below as of 2024 05:36  Patient On (Oxygen Delivery Method): nasal cannula  O2 Flow (L/min): 2       @ 07:01  -   @ 07:00  --------------------------------------------------------  IN: 0 mL / OUT: 800 mL / NET: -800 mL          PHYSICAL EXAM:  GEN: NAD, well-appearing, comfortable upright/ semirecumbent in bed  HEENT: anicteric sclera; MMM  RESP: no respiratory distress, CTA B/L; no W/R/R  CV: +S1/S2, RRR, no m/g/r  GI: soft, NT/ND, +BS  EXTREM: WWP; no edema, clubbing or cyanosis  Vascular: 2+ radial pulses B/L  NEURO: alert and awake, moving limbs spontaneously    LABS:      CBC Full  -  ( 2024 06:42 )  WBC Count : 6.69 K/uL  RBC Count : 3.59 M/uL  Hemoglobin : 11.9 g/dL  Hematocrit : 36.3 %  Platelet Count - Automated : 311 K/uL  Mean Cell Volume : 101.1 fl  Mean Cell Hemoglobin : 33.1 pg  Mean Cell Hemoglobin Concentration : 32.8 gm/dL  Auto Neutrophil # : x  Auto Lymphocyte # : x  Auto Monocyte # : x  Auto Eosinophil # : x  Auto Basophil # : x  Auto Neutrophil % : x  Auto Lymphocyte % : x  Auto Monocyte % : x  Auto Eosinophil % : x  Auto Basophil % : x        139  |  102  |  19<H>  ----------------------------<  114<H>  3.4<L>   |  31  |  0.92    Ca    9.0      2024 06:42  Phos  3.0       Mg     2.0         TPro  6.8  /  Alb  2.4<L>  /  TBili  0.5  /  DBili  x   /  AST  33  /  ALT  42  /  AlkPhos  93  02          Urinalysis Basic - ( 2024 06:42 )    Color: x / Appearance: x / SG: x / pH: x  Gluc: 114 mg/dL / Ketone: x  / Bili: x / Urobili: x   Blood: x / Protein: x / Nitrite: x   Leuk Esterase: x / RBC: x / WBC x   Sq Epi: x / Non Sq Epi: x / Bacteria: x                RADIOLOGY & ADDITIONAL STUDIES:  < from: Xray Chest 1 View- PORTABLE-Urgent (24 @ 18:36) >  INTERPRETATION:  Heart size and the mediastinum cannot be accurately evaluated on this   projection.  Calcified thoracic aorta.  There are low lung volumes.  There are bibasilar opacities.  A skin fold projects over the left chest.  No pleural effusion or pneumothorax is seen.  There is scoliosis of the spine.  IMPRESSION:  Low lung volumes.  Bibasilar opacities which could be due to subsegmental atelectasis or   multifocal infection. PULMONARY SERVICE INITIAL CONSULT NOTE    HPI:  Patient is a 89 y/o F who lives alone and walks with a walker w/ PMH of asthma, b/l DVT (20 years ago), hypothyroidism, who presented with 1 week history of worsening cough and SOB. Patient reports that she started developed a cough w/ sputum 7 days ago which has since worsened. Her cough is associated with worsening SOB, subjective fevers, chills, severe fatigue, body aches and weakness. Patient reports she is having difficulty ambulation because of weakness. Patient denies any recent headache, dizziness, lightheadedness, chest pain, palpitations, nausea, vomiting, abdominal pain, diarrhea, constipation, melena, hematochezia, dysuria, urinary frequency, or urgency. Patient denies any other complains at this time.      PULMONARY HPI:  Non smoker with h/o mild intermittent asthma, only uses albuterol when she has difficulty breathing which she says is about 3x yearly. Does not want to use maintenance or other inhalers, feels albuterol works well for her. Generally only has sx with viral URIs. Is now s/p course of abx, says her breathing is much improved, no active complaints. Continued mild cough but able to clear sputum easily. No subjective wheezing. At this time pt denies anginal/pleuritic CP, SOB/RICHTER, nasal/chest congestion, stridor, orthopnea, hemoptysis, LE edema, f/c/n/v.       REVIEW OF SYSTEMS:  Constitutional: No fever, weight loss or fatigue  Eyes: No eye pain, visual disturbances, or discharge  ENMT:  No difficulty hearing, tinnitus, vertigo; No sinus or throat pain  Neck: No pain, stiffness or neck swelling  Respiratory: see HPI  Cardiovascular: No chest pain, palpitations, dizziness or leg swelling  Gastrointestinal: No abdominal or epigastric pain. No nausea, vomiting or hematemesis; No diarrhea or constipation. No melena or hematochezia.  Genitourinary: No dysuria, frequency, hematuria or incontinence  Neurological: No headaches, memory loss, loss of strength, numbness or tremors  Skin: No itching, burning, rashes or lesions   Lymph Nodes: No enlarged glands  Endocrine: No heat or cold intolerance; No hair loss  Musculoskeletal: No joint pain or swelling; No muscle, back or extremity pain  Psychiatric: No depression, anxiety, mood swings or difficulty sleeping  Heme/Lymph: No easy bruising or bleeding gums  Allergy and Immunologic: No hives or eczema    PAST MEDICAL & SURGICAL HISTORY:  Asthma      Deep vein thrombosis (DVT)      Hyperlipidemia      Hypothyroidism      S/P hip replacement, right      H/O  section          FAMILY HISTORY:      SOCIAL HISTORY:  Smoking Status: [ ] Current, [ ] Former, [x] Never  Pack Years:    MEDICATIONS:  Pulmonary:  albuterol/ipratropium for Nebulization 3 milliLiter(s) Nebulizer every 6 hours  guaiFENesin  milliGRAM(s) Oral every 12 hours  guaiFENesin Oral Liquid (Sugar-Free) 100 milliGRAM(s) Oral every 6 hours PRN    Antimicrobials:    Anticoagulants:  apixaban 5 milliGRAM(s) Oral two times a day    Onc:    GI/:    Endocrine:  atorvastatin 40 milliGRAM(s) Oral at bedtime  levothyroxine 75 MICROGram(s) Oral daily    Cardiac:  amLODIPine   Tablet 5 milliGRAM(s) Oral daily  furosemide    Tablet 40 milliGRAM(s) Oral daily    Other Medications:  acetaminophen     Tablet .. 650 milliGRAM(s) Oral every 6 hours PRN  ALPRAZolam 0.5 milliGRAM(s) Oral at bedtime PRN  escitalopram 10 milliGRAM(s) Oral daily  fluticasone propionate 50 MICROgram(s)/spray Nasal Spray 1 Spray(s) Both Nostrils two times a day  ondansetron Injectable 4 milliGRAM(s) IV Push every 8 hours PRN  potassium chloride    Tablet ER 40 milliEquivalent(s) Oral once      Allergies    penicillin (Rash)    Intolerances        Vital Signs Last 24 Hrs  T(C): 36.7 (2024 05:36), Max: 36.7 (2024 14:26)  T(F): 98.1 (2024 05:36), Max: 98.1 (2024 05:36)  HR: 90 (2024 05:36) (89 - 98)  BP: 154/83 (2024 05:36) (96/59 - 154/83)  BP(mean): --  RR: 20 (2024 05:36) (20 - 20)  SpO2: 92% (2024 05:36) (92% - 96%)    Parameters below as of 2024 05:36  Patient On (Oxygen Delivery Method): nasal cannula  O2 Flow (L/min): 2       @ 07:01  -   @ 07:00  --------------------------------------------------------  IN: 0 mL / OUT: 800 mL / NET: -800 mL          PHYSICAL EXAM:  GEN: NAD, well-appearing, comfortable upright in chair at bedside  HEENT: anicteric sclera; MMM  RESP: no respiratory distress, scattered rhonchi and end expiratory wheezing bilaterally   CV: +S1/S2, RRR, no m/g/r  GI: soft, NT/ND, +BS  EXTREM: WWP; no edema, clubbing or cyanosis  Vascular: 2+ radial pulses B/L  NEURO: alert and awake, moving limbs spontaneously    LABS:      CBC Full  -  ( 2024 06:42 )  WBC Count : 6.69 K/uL  RBC Count : 3.59 M/uL  Hemoglobin : 11.9 g/dL  Hematocrit : 36.3 %  Platelet Count - Automated : 311 K/uL  Mean Cell Volume : 101.1 fl  Mean Cell Hemoglobin : 33.1 pg  Mean Cell Hemoglobin Concentration : 32.8 gm/dL  Auto Neutrophil # : x  Auto Lymphocyte # : x  Auto Monocyte # : x  Auto Eosinophil # : x  Auto Basophil # : x  Auto Neutrophil % : x  Auto Lymphocyte % : x  Auto Monocyte % : x  Auto Eosinophil % : x  Auto Basophil % : x        139  |  102  |  19<H>  ----------------------------<  114<H>  3.4<L>   |  31  |  0.92    Ca    9.0      2024 06:42  Phos  3.0       Mg     2.0         TPro  6.8  /  Alb  2.4<L>  /  TBili  0.5  /  DBili  x   /  AST  33  /  ALT  42  /  AlkPhos  93            Urinalysis Basic - ( 2024 06:42 )    Color: x / Appearance: x / SG: x / pH: x  Gluc: 114 mg/dL / Ketone: x  / Bili: x / Urobili: x   Blood: x / Protein: x / Nitrite: x   Leuk Esterase: x / RBC: x / WBC x   Sq Epi: x / Non Sq Epi: x / Bacteria: x                RADIOLOGY & ADDITIONAL STUDIES:  < from: Xray Chest 1 View- PORTABLE-Urgent (24 @ 18:36) >  INTERPRETATION:  Heart size and the mediastinum cannot be accurately evaluated on this   projection.  Calcified thoracic aorta.  There are low lung volumes.  There are bibasilar opacities.  A skin fold projects over the left chest.  No pleural effusion or pneumothorax is seen.  There is scoliosis of the spine.  IMPRESSION:  Low lung volumes.  Bibasilar opacities which could be due to subsegmental atelectasis or   multifocal infection.

## 2024-02-27 NOTE — DISCHARGE NOTE NURSING/CASE MANAGEMENT/SOCIAL WORK - PATIENT PORTAL LINK FT
You can access the FollowMyHealth Patient Portal offered by [facility] by registering at the following website: [email or web address]. By joining [facility]’s FollowMyHealth portal, you will also be able to view your health information using other applications (apps) compatible with our system.

## 2024-02-27 NOTE — CONSULT NOTE ADULT - ASSESSMENT
Recommendations:  - S/p course of empiric CAP abx (cef/azithro)  - Can c/w 1-2 wks of 600-1200mg guaifenesin-ER standing BID for mucolytic effect  - Titrate supplemental O2 with goal SpO2 92-95%; monitor ambulatory/exertional SpO2 and document  - Incentive spirometry 10x/h or as tolerated; mobilize OOB/TC and ambulation daily as tolerated  - On discharge would benefit from outpatient pulmonary f/u ([address], [phone]) in 1-2 weeks 88F never smoker with PMH asthma p/w cough and dyspnea and f/w likely multifocal pneumonia, now improved s/p course of abx and supportive care.    # Multifocal pna  # Asthma  # Bronchitis/bronchiolitis    Recommendations:  - S/p course of empiric CAP abx (cef/azithro)  - Can c/w 1-2 wks of 600-1200mg guaifenesin-ER standing BID for mucolytic effect  - Given diffuse wheezing, would start course of prednisone 40mg x 5d. Discussed possible SEs  - Titrate supplemental O2 with goal SpO2 92-95%; monitor ambulatory/exertional SpO2 and document --> normoxic on RA at rest this AM on exam  - Incentive spirometry 10x/h or as tolerated; mobilize OOB/TC and ambulation daily as tolerated  - On discharge would benefit from outpatient pulmonary f/u ([address], [phone]) in 1-2 weeks, repeat CXR in 6 wks

## 2024-02-27 NOTE — GOALS OF CARE CONVERSATION - ADVANCED CARE PLANNING - CONVERSATION DETAILS
Spoke to daughter, Nida (HCP) about her mother's current clinical status and prognosis. Discussed that she is currently medically stable, however, if her mother were to clinically decline in the future, her mother would not like to be on life support regardless if she had a cardiac arrest of if she had a respiratory issue requiring intubation. She confirmed that her mother's code status is DNR/DNI.    MOLST form completed and placed in chart

## 2024-03-01 ENCOUNTER — TRANSCRIPTION ENCOUNTER (OUTPATIENT)
Age: 89
End: 2024-03-01

## 2024-03-02 NOTE — DISCHARGE NOTE PROVIDER - [id number]_GEN_ALL_CORE
DISPLAY PLAN FREE TEXT
This document is complete and the patient is ready for discharge.
DISPLAY PLAN FREE TEXT

## 2024-03-07 ENCOUNTER — TRANSCRIPTION ENCOUNTER (OUTPATIENT)
Age: 89
End: 2024-03-07

## 2024-03-14 ENCOUNTER — TRANSCRIPTION ENCOUNTER (OUTPATIENT)
Age: 89
End: 2024-03-14

## 2024-03-21 ENCOUNTER — TRANSCRIPTION ENCOUNTER (OUTPATIENT)
Age: 89
End: 2024-03-21

## 2024-05-06 NOTE — OCCUPATIONAL THERAPY INITIAL EVALUATION ADULT - TOILETING, PREVIOUS LEVEL OF FUNCTION, OT EVAL
Problem: Discharge Planning  Goal: [place] home or other facility with appropriate resources  Outcome: Progressing     Problem: Skin/Tissue Integrity  Goal: Absence of new skin breakdown  Description: 1.  Monitor for areas of redness and/or skin breakdown  2.  Assess vascular access sites hourly  3.  Every 4-6 hours minimum:  Change oxygen saturation probe site  4.  Every 4-6 hours:  If on nasal continuous positive airway pressure, respiratory therapy assess nares and determine need for appliance change or resting period.  5/6/2024 1938 by Mariaa Tao RN  Outcome: Progressing  5/6/2024 1020 by Faith Saldivar RN  Outcome: Progressing     Problem: ABCDS Injury Assessment  Goal: Absence of physical injury  5/6/2024 1938 by Mariaa Tao RN  Outcome: Progressing  5/6/2024 1020 by Faith Saldivar RN  Outcome: Progressing  Flowsheets (Taken 5/6/2024 1020)  Absence of Physical Injury: Implement safety measures based on patient assessment     Problem: Respiratory - Adult  Goal: Achieves optimal ventilation and oxygenation  5/6/2024 1938 by Mariaa Tao RN  Outcome: Progressing  5/6/2024 1020 by Faith Saldivar RN  Outcome: Progressing  5/6/2024 0838 by Simerlink, Patricia, RCP  Outcome: Progressing  Flowsheets (Taken 5/6/2024 0800 by Faith Saldivar RN)  Achieves optimal ventilation and oxygenation:   Assess for changes in mentation and behavior   Assess for changes in respiratory status     Problem: Cardiovascular - Adult  Goal: Maintains optimal cardiac output and hemodynamic stability  5/6/2024 1020 by Faith Saldivar RN  Outcome: Progressing  Flowsheets (Taken 5/6/2024 0800)  Maintains optimal cardiac output and hemodynamic stability: Monitor blood pressure and heart rate  Goal: Absence of cardiac dysrhythmias or at baseline  5/6/2024 1020 by Faith Saldivar RN  Outcome: Progressing  Flowsheets (Taken 5/6/2024 0800)  Absence of cardiac dysrhythmias or at baseline: Monitor cardiac rate and rhythm      Problem: Gastrointestinal - Adult  Goal: Minimal or absence of nausea and vomiting  5/6/2024 1020 by Faith Saldivar RN  Outcome: Progressing  Flowsheets (Taken 5/6/2024 0800)  Minimal or absence of nausea and vomiting: Maintain NPO status until nausea and vomiting are resolved  Goal: Maintains or returns to baseline bowel function  5/6/2024 1020 by Faith Saldivar RN  Outcome: Progressing  Flowsheets (Taken 5/6/2024 0800)  Maintains or returns to baseline bowel function: Assess bowel function  Goal: Maintains adequate nutritional intake  5/6/2024 1020 by Faith Saldivar RN  Outcome: Progressing  Flowsheets (Taken 5/6/2024 0800)  Maintains adequate nutritional intake: Monitor intake and output, weight and lab values     Problem: Metabolic/Fluid and Electrolytes - Adult  Goal: Electrolytes maintained within normal limits  5/6/2024 1020 by Faith Saldivar RN  Outcome: Progressing  Flowsheets (Taken 5/6/2024 0800)  Electrolytes maintained within normal limits:   Monitor labs and assess patient for signs and symptoms of electrolyte imbalances   Administer electrolyte replacement as ordered  Goal: Hemodynamic stability and optimal renal function maintained  5/6/2024 1020 by Faith Saldivar RN  Outcome: Progressing  Flowsheets (Taken 5/6/2024 0800)  Hemodynamic stability and optimal renal function maintained: Monitor labs and assess for signs and symptoms of volume excess or deficit  Goal: Glucose maintained within prescribed range  5/6/2024 1020 by Faith Saldivar RN  Outcome: Progressing  Flowsheets (Taken 5/6/2024 0800)  Glucose maintained within prescribed range: Monitor blood glucose as ordered     Problem: Hematologic - Adult  Goal: Maintains hematologic stability  5/6/2024 1020 by Faith Saldivar RN  Outcome: Progressing  Flowsheets (Taken 5/6/2024 0800)  Maintains hematologic stability: Assess for signs and symptoms of bleeding or hemorrhage     Problem: Chronic Conditions and Co-morbidities  Goal: Patient's  independent

## 2024-09-10 NOTE — H&P PST ADULT - HEARING DISTURBANCE
Quality 431: Preventive Care And Screening: Unhealthy Alcohol Use - Screening: Patient not identified as an unhealthy alcohol user when screened for unhealthy alcohol use using a systematic screening method Detail Level: Detailed Quality 226: Preventive Care And Screening: Tobacco Use: Screening And Cessation Intervention: Patient screened for tobacco use and is an ex/non-smoker show loss

## 2024-10-23 ENCOUNTER — NON-APPOINTMENT (OUTPATIENT)
Age: 89
End: 2024-10-23

## 2024-10-23 ENCOUNTER — APPOINTMENT (OUTPATIENT)
Age: 89
End: 2024-10-23
Payer: MEDICARE

## 2024-10-23 PROCEDURE — 92014 COMPRE OPH EXAM EST PT 1/>: CPT

## 2024-10-23 PROCEDURE — 92004 COMPRE OPH EXAM NEW PT 1/>: CPT
